# Patient Record
Sex: MALE | Race: WHITE | Employment: UNEMPLOYED
[De-identification: names, ages, dates, MRNs, and addresses within clinical notes are randomized per-mention and may not be internally consistent; named-entity substitution may affect disease eponyms.]

---

## 2023-09-24 ENCOUNTER — APPOINTMENT (OUTPATIENT)
Facility: HOSPITAL | Age: 58
End: 2023-09-24
Attending: EMERGENCY MEDICINE
Payer: COMMERCIAL

## 2023-09-24 ENCOUNTER — HOSPITAL ENCOUNTER (OUTPATIENT)
Facility: HOSPITAL | Age: 58
Setting detail: OBSERVATION
Discharge: HOME | End: 2023-09-25
Attending: EMERGENCY MEDICINE | Admitting: HOSPITALIST
Payer: COMMERCIAL

## 2023-09-24 DIAGNOSIS — L02.611 ABSCESS OF RIGHT FOOT: Primary | ICD-10-CM

## 2023-09-24 DIAGNOSIS — L03.115 CELLULITIS OF RIGHT FOOT: ICD-10-CM

## 2023-09-24 PROBLEM — L03.90 CELLULITIS: Status: ACTIVE | Noted: 2023-09-24

## 2023-09-24 PROBLEM — R73.9 HYPERGLYCEMIA: Status: ACTIVE | Noted: 2023-09-24

## 2023-09-24 PROBLEM — I10 HTN (HYPERTENSION): Status: ACTIVE | Noted: 2023-09-24

## 2023-09-24 PROBLEM — E86.0 DEHYDRATION: Status: ACTIVE | Noted: 2023-09-24

## 2023-09-24 LAB
ALBUMIN SERPL-MCNC: 3.6 G/DL (ref 3.5–5)
ALBUMIN/GLOB SERPL: 0.9 (ref 1.1–2.2)
ALP SERPL-CCNC: 95 U/L (ref 45–117)
ALT SERPL-CCNC: 38 U/L (ref 12–78)
ANION GAP SERPL CALC-SCNC: 10 MMOL/L (ref 5–15)
AST SERPL W P-5'-P-CCNC: 20 U/L (ref 15–37)
BASOPHILS # BLD: 0.1 K/UL (ref 0–0.1)
BASOPHILS NFR BLD: 1 % (ref 0–1)
BILIRUB SERPL-MCNC: 0.4 MG/DL (ref 0.2–1)
BUN SERPL-MCNC: 21 MG/DL (ref 6–20)
BUN/CREAT SERPL: 20 (ref 12–20)
CA-I BLD-MCNC: 9.1 MG/DL (ref 8.5–10.1)
CHLORIDE SERPL-SCNC: 103 MMOL/L (ref 97–108)
CO2 SERPL-SCNC: 24 MMOL/L (ref 21–32)
CREAT SERPL-MCNC: 1.06 MG/DL (ref 0.7–1.3)
CRP SERPL-MCNC: 3.68 MG/DL (ref 0–0.6)
DIFFERENTIAL METHOD BLD: ABNORMAL
EOSINOPHIL # BLD: 0.5 K/UL (ref 0–0.4)
EOSINOPHIL NFR BLD: 5 % (ref 0–7)
ERYTHROCYTE [DISTWIDTH] IN BLOOD BY AUTOMATED COUNT: 13.2 % (ref 11.5–14.5)
GLOBULIN SER CALC-MCNC: 4.2 G/DL (ref 2–4)
GLUCOSE BLD STRIP.AUTO-MCNC: 129 MG/DL (ref 65–100)
GLUCOSE BLD STRIP.AUTO-MCNC: 143 MG/DL (ref 65–100)
GLUCOSE BLD STRIP.AUTO-MCNC: 98 MG/DL (ref 65–100)
GLUCOSE SERPL-MCNC: 165 MG/DL (ref 65–100)
HCT VFR BLD AUTO: 44.5 % (ref 36.6–50.3)
HGB BLD-MCNC: 14.8 G/DL (ref 12.1–17)
IMM GRANULOCYTES # BLD AUTO: 0.3 K/UL (ref 0–0.04)
IMM GRANULOCYTES NFR BLD AUTO: 3 % (ref 0–0.5)
LYMPHOCYTES # BLD: 1.3 K/UL (ref 0.8–3.5)
LYMPHOCYTES NFR BLD: 14 % (ref 12–49)
MAGNESIUM SERPL-MCNC: 2.1 MG/DL (ref 1.6–2.4)
MCH RBC QN AUTO: 29.4 PG (ref 26–34)
MCHC RBC AUTO-ENTMCNC: 33.3 G/DL (ref 30–36.5)
MCV RBC AUTO: 88.3 FL (ref 80–99)
MONOCYTES # BLD: 0.7 K/UL (ref 0–1)
MONOCYTES NFR BLD: 8 % (ref 5–13)
NEUTS SEG # BLD: 6.3 K/UL (ref 1.8–8)
NEUTS SEG NFR BLD: 69 % (ref 32–75)
NRBC # BLD: 0 K/UL (ref 0–0.01)
NRBC BLD-RTO: 0 PER 100 WBC
PERFORMED BY:: ABNORMAL
PERFORMED BY:: ABNORMAL
PERFORMED BY:: NORMAL
PLATELET # BLD AUTO: 324 K/UL (ref 150–400)
PMV BLD AUTO: 9.8 FL (ref 8.9–12.9)
POTASSIUM SERPL-SCNC: 3.9 MMOL/L (ref 3.5–5.1)
PROT SERPL-MCNC: 7.8 G/DL (ref 6.4–8.2)
RBC # BLD AUTO: 5.04 M/UL (ref 4.1–5.7)
RBC MORPH BLD: ABNORMAL
SODIUM SERPL-SCNC: 137 MMOL/L (ref 136–145)
URATE SERPL-MCNC: 3.7 MG/DL (ref 3.5–7.2)
WBC # BLD AUTO: 9.2 K/UL (ref 4.1–11.1)
WBC MORPH BLD: ABNORMAL

## 2023-09-24 PROCEDURE — 87205 SMEAR GRAM STAIN: CPT

## 2023-09-24 PROCEDURE — 87147 CULTURE TYPE IMMUNOLOGIC: CPT

## 2023-09-24 PROCEDURE — 84550 ASSAY OF BLOOD/URIC ACID: CPT

## 2023-09-24 PROCEDURE — 2580000003 HC RX 258: Performed by: HOSPITALIST

## 2023-09-24 PROCEDURE — 99285 EMERGENCY DEPT VISIT HI MDM: CPT

## 2023-09-24 PROCEDURE — 87070 CULTURE OTHR SPECIMN AEROBIC: CPT

## 2023-09-24 PROCEDURE — 6370000000 HC RX 637 (ALT 250 FOR IP): Performed by: HOSPITALIST

## 2023-09-24 PROCEDURE — G0378 HOSPITAL OBSERVATION PER HR: HCPCS

## 2023-09-24 PROCEDURE — 6360000002 HC RX W HCPCS: Performed by: HOSPITALIST

## 2023-09-24 PROCEDURE — 83735 ASSAY OF MAGNESIUM: CPT

## 2023-09-24 PROCEDURE — 96366 THER/PROPH/DIAG IV INF ADDON: CPT

## 2023-09-24 PROCEDURE — 2500000003 HC RX 250 WO HCPCS: Performed by: EMERGENCY MEDICINE

## 2023-09-24 PROCEDURE — 73700 CT LOWER EXTREMITY W/O DYE: CPT

## 2023-09-24 PROCEDURE — 87186 SC STD MICRODIL/AGAR DIL: CPT

## 2023-09-24 PROCEDURE — 84145 PROCALCITONIN (PCT): CPT

## 2023-09-24 PROCEDURE — 6360000002 HC RX W HCPCS: Performed by: EMERGENCY MEDICINE

## 2023-09-24 PROCEDURE — 96365 THER/PROPH/DIAG IV INF INIT: CPT

## 2023-09-24 PROCEDURE — 87077 CULTURE AEROBIC IDENTIFY: CPT

## 2023-09-24 PROCEDURE — 86140 C-REACTIVE PROTEIN: CPT

## 2023-09-24 PROCEDURE — 96372 THER/PROPH/DIAG INJ SC/IM: CPT

## 2023-09-24 PROCEDURE — 85025 COMPLETE CBC W/AUTO DIFF WBC: CPT

## 2023-09-24 PROCEDURE — 82962 GLUCOSE BLOOD TEST: CPT

## 2023-09-24 PROCEDURE — 80053 COMPREHEN METABOLIC PANEL: CPT

## 2023-09-24 PROCEDURE — 2580000003 HC RX 258: Performed by: EMERGENCY MEDICINE

## 2023-09-24 PROCEDURE — 36415 COLL VENOUS BLD VENIPUNCTURE: CPT

## 2023-09-24 PROCEDURE — 96376 TX/PRO/DX INJ SAME DRUG ADON: CPT

## 2023-09-24 PROCEDURE — 83036 HEMOGLOBIN GLYCOSYLATED A1C: CPT

## 2023-09-24 PROCEDURE — 96375 TX/PRO/DX INJ NEW DRUG ADDON: CPT

## 2023-09-24 RX ORDER — METOPROLOL SUCCINATE 25 MG/1
25 TABLET, EXTENDED RELEASE ORAL DAILY
Status: DISCONTINUED | OUTPATIENT
Start: 2023-09-25 | End: 2023-09-25 | Stop reason: HOSPADM

## 2023-09-24 RX ORDER — METOPROLOL SUCCINATE 25 MG/1
TABLET, EXTENDED RELEASE ORAL
COMMUNITY
Start: 2023-07-14

## 2023-09-24 RX ORDER — ACETAMINOPHEN 650 MG/1
650 SUPPOSITORY RECTAL EVERY 6 HOURS PRN
Status: DISCONTINUED | OUTPATIENT
Start: 2023-09-24 | End: 2023-09-25 | Stop reason: HOSPADM

## 2023-09-24 RX ORDER — ENOXAPARIN SODIUM 100 MG/ML
40 INJECTION SUBCUTANEOUS DAILY
Status: DISCONTINUED | OUTPATIENT
Start: 2023-09-24 | End: 2023-09-25 | Stop reason: HOSPADM

## 2023-09-24 RX ORDER — LOVASTATIN 40 MG/1
40 TABLET ORAL DAILY
COMMUNITY
Start: 2023-07-14

## 2023-09-24 RX ORDER — ONDANSETRON 4 MG/1
4 TABLET, ORALLY DISINTEGRATING ORAL EVERY 8 HOURS PRN
Status: DISCONTINUED | OUTPATIENT
Start: 2023-09-24 | End: 2023-09-25 | Stop reason: HOSPADM

## 2023-09-24 RX ORDER — ACETAMINOPHEN 325 MG/1
650 TABLET ORAL EVERY 6 HOURS PRN
Status: DISCONTINUED | OUTPATIENT
Start: 2023-09-24 | End: 2023-09-25 | Stop reason: HOSPADM

## 2023-09-24 RX ORDER — SODIUM CHLORIDE AND POTASSIUM CHLORIDE 150; 900 MG/100ML; MG/100ML
INJECTION, SOLUTION INTRAVENOUS CONTINUOUS
Status: DISCONTINUED | OUTPATIENT
Start: 2023-09-24 | End: 2023-09-25 | Stop reason: HOSPADM

## 2023-09-24 RX ORDER — ATORVASTATIN CALCIUM 20 MG/1
10 TABLET, FILM COATED ORAL DAILY
Status: DISCONTINUED | OUTPATIENT
Start: 2023-09-25 | End: 2023-09-25 | Stop reason: HOSPADM

## 2023-09-24 RX ORDER — DEXTROSE MONOHYDRATE 100 MG/ML
INJECTION, SOLUTION INTRAVENOUS CONTINUOUS PRN
Status: DISCONTINUED | OUTPATIENT
Start: 2023-09-24 | End: 2023-09-25 | Stop reason: HOSPADM

## 2023-09-24 RX ORDER — SODIUM CHLORIDE 0.9 % (FLUSH) 0.9 %
5-40 SYRINGE (ML) INJECTION EVERY 12 HOURS SCHEDULED
Status: DISCONTINUED | OUTPATIENT
Start: 2023-09-24 | End: 2023-09-25 | Stop reason: HOSPADM

## 2023-09-24 RX ORDER — SULFAMETHOXAZOLE AND TRIMETHOPRIM 800; 160 MG/1; MG/1
1 TABLET ORAL EVERY 12 HOURS
Status: ON HOLD | COMMUNITY
Start: 2023-09-19 | End: 2023-09-25 | Stop reason: HOSPADM

## 2023-09-24 RX ORDER — LIDOCAINE HYDROCHLORIDE 10 MG/ML
5 INJECTION, SOLUTION EPIDURAL; INFILTRATION; INTRACAUDAL; PERINEURAL
Status: COMPLETED | OUTPATIENT
Start: 2023-09-24 | End: 2023-09-24

## 2023-09-24 RX ORDER — INSULIN LISPRO 100 [IU]/ML
0-8 INJECTION, SOLUTION INTRAVENOUS; SUBCUTANEOUS
Status: DISCONTINUED | OUTPATIENT
Start: 2023-09-24 | End: 2023-09-25

## 2023-09-24 RX ORDER — SODIUM CHLORIDE 0.9 % (FLUSH) 0.9 %
5-40 SYRINGE (ML) INJECTION PRN
Status: DISCONTINUED | OUTPATIENT
Start: 2023-09-24 | End: 2023-09-25 | Stop reason: HOSPADM

## 2023-09-24 RX ORDER — TRIAMCINOLONE ACETONIDE 1 MG/G
OINTMENT TOPICAL
COMMUNITY
Start: 2023-07-20

## 2023-09-24 RX ORDER — INSULIN LISPRO 100 [IU]/ML
0-4 INJECTION, SOLUTION INTRAVENOUS; SUBCUTANEOUS NIGHTLY
Status: DISCONTINUED | OUTPATIENT
Start: 2023-09-24 | End: 2023-09-25

## 2023-09-24 RX ORDER — SODIUM CHLORIDE 9 MG/ML
INJECTION, SOLUTION INTRAVENOUS PRN
Status: DISCONTINUED | OUTPATIENT
Start: 2023-09-24 | End: 2023-09-25 | Stop reason: HOSPADM

## 2023-09-24 RX ORDER — KETOROLAC TROMETHAMINE 30 MG/ML
30 INJECTION, SOLUTION INTRAMUSCULAR; INTRAVENOUS EVERY 6 HOURS
Status: DISCONTINUED | OUTPATIENT
Start: 2023-09-24 | End: 2023-09-25 | Stop reason: HOSPADM

## 2023-09-24 RX ORDER — ONDANSETRON 2 MG/ML
4 INJECTION INTRAMUSCULAR; INTRAVENOUS EVERY 6 HOURS PRN
Status: DISCONTINUED | OUTPATIENT
Start: 2023-09-24 | End: 2023-09-25 | Stop reason: HOSPADM

## 2023-09-24 RX ORDER — POLYETHYLENE GLYCOL 3350 17 G/17G
17 POWDER, FOR SOLUTION ORAL DAILY PRN
Status: DISCONTINUED | OUTPATIENT
Start: 2023-09-24 | End: 2023-09-25 | Stop reason: HOSPADM

## 2023-09-24 RX ADMIN — SODIUM CHLORIDE, PRESERVATIVE FREE 10 ML: 5 INJECTION INTRAVENOUS at 14:27

## 2023-09-24 RX ADMIN — KETOROLAC TROMETHAMINE 30 MG: 30 INJECTION, SOLUTION INTRAMUSCULAR; INTRAVENOUS at 14:25

## 2023-09-24 RX ADMIN — PIPERACILLIN SODIUM AND TAZOBACTAM SODIUM 3375 MG: 3; .375 INJECTION, POWDER, LYOPHILIZED, FOR SOLUTION INTRAVENOUS at 17:22

## 2023-09-24 RX ADMIN — POTASSIUM CHLORIDE AND SODIUM CHLORIDE: 900; 150 INJECTION, SOLUTION INTRAVENOUS at 14:25

## 2023-09-24 RX ADMIN — LIDOCAINE HYDROCHLORIDE 5 ML: 10 INJECTION, SOLUTION EPIDURAL; INFILTRATION; INTRACAUDAL; PERINEURAL at 10:46

## 2023-09-24 RX ADMIN — ENOXAPARIN SODIUM 40 MG: 100 INJECTION SUBCUTANEOUS at 14:26

## 2023-09-24 RX ADMIN — PIPERACILLIN AND TAZOBACTAM 3375 MG: 3; .375 INJECTION, POWDER, LYOPHILIZED, FOR SOLUTION INTRAVENOUS at 10:54

## 2023-09-24 RX ADMIN — SODIUM CHLORIDE, PRESERVATIVE FREE 10 ML: 5 INJECTION INTRAVENOUS at 17:23

## 2023-09-24 RX ADMIN — SODIUM CHLORIDE, PRESERVATIVE FREE 10 ML: 5 INJECTION INTRAVENOUS at 21:10

## 2023-09-24 RX ADMIN — KETOROLAC TROMETHAMINE 30 MG: 30 INJECTION, SOLUTION INTRAMUSCULAR; INTRAVENOUS at 20:07

## 2023-09-24 RX ADMIN — METOPROLOL TARTRATE 25 MG: 25 TABLET, FILM COATED ORAL at 14:42

## 2023-09-24 ASSESSMENT — PAIN SCALES - GENERAL
PAINLEVEL_OUTOF10: 0

## 2023-09-24 ASSESSMENT — ENCOUNTER SYMPTOMS
GASTROINTESTINAL NEGATIVE: 1
ROS SKIN COMMENTS: RIGHT FOOT
RESPIRATORY NEGATIVE: 1
EYES NEGATIVE: 1
ALLERGIC/IMMUNOLOGIC NEGATIVE: 1

## 2023-09-24 ASSESSMENT — PAIN SCALES - WONG BAKER: WONGBAKER_NUMERICALRESPONSE: 2

## 2023-09-24 ASSESSMENT — PAIN - FUNCTIONAL ASSESSMENT: PAIN_FUNCTIONAL_ASSESSMENT: WONG-BAKER FACES

## 2023-09-24 NOTE — ED TRIAGE NOTES
Pt states that he went to urgent care on Tuesday and they told him he had cellulitis of his right foot and gave him Bactrim BID [id number]. He was told to follow up with the ED if it does not get better in a few days. Pt states that it is not better and that he is now having puss come out of his foot.

## 2023-09-24 NOTE — PROGRESS NOTES
Patient arrived to room with a 4 x 4 bandage loose on right foot- ER tech stated patient removed MD's dressing- wound remain packed but draining large amount of bloody drainage foot wiped off and dry dressing reapplied with gripper sock in place. Incision 4 cm wide and 3 cm in length on top right foot.

## 2023-09-24 NOTE — ED NOTES
The top of the pt's right foot is a large redded blistered area with some scabs long the middle toe. Pt has swelling and redness to the top of the right foot upwards toward the middle of the foot. Pt has <3 seconds cap refill and sensation in all of the toes on the right foot. Pt denies having knowledge if he was bitten by something but does state that he had a blister on the top of the right middle toe that he popped prior to all of the inflammation and redness occurring.       Carlos Nunes RN  09/24/23 1004

## 2023-09-24 NOTE — H&P
History and Physical  Dr Fernando Hernandez MD      Chief Complaints:     Chief Complaint   Patient presents with    Cellulitis     Right foot         Subjective:     Hammad Payne is a 63 y.o. male followed by Philomena Hough MD and  has a past medical history of Hypercholesteremia and Hypertension. Presents with worsening right foot swelling/pain/redness. States it started on Tuesday He noted a bump on second toe and she popped blister and since that time he noted increasing swelling and continued to progress and so went on Wednesday to the urgent care and was started on Bactrim on 9/19 but he continued to have worsening swelling redness and pain. He says he still was able to stand but was having pain. He was not able to follow-up with his primary provider as currently she is on occasion but came to the emergency evaluated  -Initial laboratory data showed a normal white blood count vital signs showed elevated blood pressure 150/115 with a heart rate of 113 and ER physician performed bedside incision and drainage with copious discharge noted and sent for culture. Patient did not meet sepsis bacteria as no fever or elevated white blood count. After incision drainage wound was packed and requested CT of the foot which shows 0.9 x 1.5 cm area of concern with no further evidence of abscess or osteomyelitis noted. Was given Zosyn 3.275 g x 1. Discussed management with the ED provider Dr. Samy Choe MD and agree with the plan for hospitalization and observation for acute right foot cellulitis      Past Medical History:   Diagnosis Date    Hypercholesteremia     Hypertension       History reviewed. No pertinent surgical history. History reviewed. No pertinent family history. Social History     Tobacco Use    Smoking status: Never    Smokeless tobacco: Never   Substance Use Topics    Alcohol use: Not Currently       Prior to Admission medications    Medication Sig Start Date End Date Taking?  Authorizing

## 2023-09-24 NOTE — ED PROVIDER NOTES
[facility] EMERGENCY DEPT  EMERGENCY DEPARTMENT ENCOUNTER      Pt Name: Yolanda Tsai  MRN: [id number]  [address] [facility] [place] 1965  Date of evaluation: 9/24/2023  Provider: Cate Joaquin MD    CHIEF COMPLAINT       Chief Complaint   Patient presents with    Cellulitis     Right foot         HISTORY OF PRESENT ILLNESS   (Location/Symptom, Timing/Onset, Context/Setting, Quality, Duration, Modifying Factors, Severity)  Note limiting factors. Yolanda Tsai is a 63 y.o. male who presents to the emergency department section of right foot beginning over a week ago was seen in urgent care 6 days ago and begun on Bactrim DS twice daily which the patient has been compliant with. The area of redness and swelling his dorsal right foot has become worse over the last 2 days. Some purulent drainage. History of hypertension all hypercholesterol though no history of diabetes or prior abscess or MRSA. Denies fever chills nausea vomiting no other complaints    HPI    Nursing Notes were reviewed. REVIEW OF SYSTEMS    (2-9 systems for level 4, 10 or more for level 5)     Review of Systems   All other systems reviewed and are negative. Except as noted above the remainder of the review of systems was reviewed and negative. PAST MEDICAL HISTORY     Past Medical History:   Diagnosis Date    Hypercholesteremia     Hypertension          SURGICAL HISTORY     History reviewed. No pertinent surgical history. CURRENT MEDICATIONS       Previous Medications    LOVASTATIN (MEVACOR) 40 MG TABLET    Take 1 tablet by mouth daily    METOPROLOL SUCCINATE (TOPROL XL) 25 MG EXTENDED RELEASE TABLET        SULFAMETHOXAZOLE-TRIMETHOPRIM (BACTRIM DS;SEPTRA DS) 800-160 MG PER TABLET    Take 1 tablet by mouth in the morning and 1 tablet in the evening. for 10 days. TRIAMCINOLONE (KENALOG) 0.1 % OINTMENT           ALLERGIES     Patient has no known allergies. FAMILY HISTORY     History reviewed. No pertinent family history.        SOCIAL

## 2023-09-25 VITALS
RESPIRATION RATE: 18 BRPM | HEIGHT: 69 IN | WEIGHT: 166.06 LBS | TEMPERATURE: 98.6 F | HEART RATE: 84 BPM | SYSTOLIC BLOOD PRESSURE: 118 MMHG | BODY MASS INDEX: 24.59 KG/M2 | DIASTOLIC BLOOD PRESSURE: 91 MMHG | OXYGEN SATURATION: 96 %

## 2023-09-25 LAB
ANION GAP SERPL CALC-SCNC: 8 MMOL/L (ref 5–15)
BASOPHILS # BLD: 0.1 K/UL (ref 0–0.1)
BASOPHILS NFR BLD: 1 % (ref 0–1)
BUN SERPL-MCNC: 16 MG/DL (ref 6–20)
BUN/CREAT SERPL: 16 (ref 12–20)
CA-I BLD-MCNC: 8.6 MG/DL (ref 8.5–10.1)
CHLORIDE SERPL-SCNC: 107 MMOL/L (ref 97–108)
CHOLEST SERPL-MCNC: 132 MG/DL
CO2 SERPL-SCNC: 26 MMOL/L (ref 21–32)
CREAT SERPL-MCNC: 1 MG/DL (ref 0.7–1.3)
DIFFERENTIAL METHOD BLD: ABNORMAL
EOSINOPHIL # BLD: 0.6 K/UL (ref 0–0.4)
EOSINOPHIL NFR BLD: 10 % (ref 0–7)
ERYTHROCYTE [DISTWIDTH] IN BLOOD BY AUTOMATED COUNT: 13 % (ref 11.5–14.5)
EST. AVERAGE GLUCOSE BLD GHB EST-MCNC: 114 MG/DL
GLUCOSE BLD STRIP.AUTO-MCNC: 104 MG/DL (ref 65–100)
GLUCOSE SERPL-MCNC: 106 MG/DL (ref 65–100)
HBA1C MFR BLD: 5.6 % (ref 4–5.6)
HCT VFR BLD AUTO: 39.6 % (ref 36.6–50.3)
HDLC SERPL-MCNC: 27 MG/DL
HDLC SERPL: 4.9 (ref 0–5)
HGB BLD-MCNC: 13.3 G/DL (ref 12.1–17)
IMM GRANULOCYTES # BLD AUTO: 0 K/UL
IMM GRANULOCYTES NFR BLD AUTO: 0 %
LDLC SERPL CALC-MCNC: 79 MG/DL (ref 0–100)
LIPID PANEL: NORMAL
LYMPHOCYTES # BLD: 1.2 K/UL (ref 0.8–3.5)
LYMPHOCYTES NFR BLD: 21 % (ref 12–49)
MCH RBC QN AUTO: 29.6 PG (ref 26–34)
MCHC RBC AUTO-ENTMCNC: 33.6 G/DL (ref 30–36.5)
MCV RBC AUTO: 88.2 FL (ref 80–99)
MONOCYTES # BLD: 0.9 K/UL (ref 0–1)
MONOCYTES NFR BLD: 15 % (ref 5–13)
NEUTS SEG # BLD: 3.1 K/UL (ref 1.8–8)
NEUTS SEG NFR BLD: 53 % (ref 32–75)
NRBC # BLD: 0 K/UL (ref 0–0.01)
NRBC BLD-RTO: 0 PER 100 WBC
PERFORMED BY:: ABNORMAL
PLATELET # BLD AUTO: 320 K/UL (ref 150–400)
PLATELET COMMENT: ABNORMAL
PMV BLD AUTO: 10 FL (ref 8.9–12.9)
POTASSIUM SERPL-SCNC: 4.5 MMOL/L (ref 3.5–5.1)
PROCALCITONIN SERPL-MCNC: <0.05 NG/ML
RBC # BLD AUTO: 4.49 M/UL (ref 4.1–5.7)
RBC MORPH BLD: ABNORMAL
SODIUM SERPL-SCNC: 141 MMOL/L (ref 136–145)
TRIGL SERPL-MCNC: 130 MG/DL
VLDLC SERPL CALC-MCNC: 26 MG/DL
WBC # BLD AUTO: 5.9 K/UL (ref 4.1–11.1)

## 2023-09-25 PROCEDURE — 2580000003 HC RX 258: Performed by: HOSPITALIST

## 2023-09-25 PROCEDURE — 6360000002 HC RX W HCPCS: Performed by: HOSPITALIST

## 2023-09-25 PROCEDURE — 85025 COMPLETE CBC W/AUTO DIFF WBC: CPT

## 2023-09-25 PROCEDURE — 96376 TX/PRO/DX INJ SAME DRUG ADON: CPT

## 2023-09-25 PROCEDURE — G0378 HOSPITAL OBSERVATION PER HR: HCPCS

## 2023-09-25 PROCEDURE — 80061 LIPID PANEL: CPT

## 2023-09-25 PROCEDURE — 96366 THER/PROPH/DIAG IV INF ADDON: CPT

## 2023-09-25 PROCEDURE — 36415 COLL VENOUS BLD VENIPUNCTURE: CPT

## 2023-09-25 PROCEDURE — 82962 GLUCOSE BLOOD TEST: CPT

## 2023-09-25 PROCEDURE — 80048 BASIC METABOLIC PNL TOTAL CA: CPT

## 2023-09-25 PROCEDURE — 96367 TX/PROPH/DG ADDL SEQ IV INF: CPT

## 2023-09-25 PROCEDURE — 6370000000 HC RX 637 (ALT 250 FOR IP): Performed by: HOSPITALIST

## 2023-09-25 RX ORDER — AMOXICILLIN AND CLAVULANATE POTASSIUM 875; 125 MG/1; MG/1
1 TABLET, FILM COATED ORAL 2 TIMES DAILY
Qty: 20 TABLET | Refills: 0 | Status: SHIPPED | OUTPATIENT
Start: 2023-09-25 | End: 2023-10-05

## 2023-09-25 RX ORDER — TRAMADOL HYDROCHLORIDE 50 MG/1
50 TABLET ORAL EVERY 6 HOURS PRN
Qty: 12 TABLET | Refills: 0 | Status: SHIPPED | OUTPATIENT
Start: 2023-09-25 | End: 2023-09-28

## 2023-09-25 RX ADMIN — METOPROLOL SUCCINATE 25 MG: 25 TABLET, EXTENDED RELEASE ORAL at 09:10

## 2023-09-25 RX ADMIN — POTASSIUM CHLORIDE AND SODIUM CHLORIDE: 900; 150 INJECTION, SOLUTION INTRAVENOUS at 07:49

## 2023-09-25 RX ADMIN — PIPERACILLIN SODIUM AND TAZOBACTAM SODIUM 3375 MG: 3; .375 INJECTION, POWDER, LYOPHILIZED, FOR SOLUTION INTRAVENOUS at 01:11

## 2023-09-25 RX ADMIN — ATORVASTATIN CALCIUM 10 MG: 20 TABLET, FILM COATED ORAL at 09:10

## 2023-09-25 RX ADMIN — KETOROLAC TROMETHAMINE 30 MG: 30 INJECTION, SOLUTION INTRAMUSCULAR; INTRAVENOUS at 07:50

## 2023-09-25 RX ADMIN — SODIUM CHLORIDE, PRESERVATIVE FREE 10 ML: 5 INJECTION INTRAVENOUS at 07:50

## 2023-09-25 RX ADMIN — KETOROLAC TROMETHAMINE 30 MG: 30 INJECTION, SOLUTION INTRAMUSCULAR; INTRAVENOUS at 01:11

## 2023-09-25 ASSESSMENT — PAIN SCALES - GENERAL
PAINLEVEL_OUTOF10: 0

## 2023-09-25 NOTE — PLAN OF CARE
Problem: Pain  Goal: Verbalizes/displays adequate comfort level or baseline comfort level  9/25/2023 1116 by Claudia Ring RN  [address] Progressing  9/25/2023 0827 by Claudia Ring RN  Outcome: Progressing  Flowsheets (Taken 9/25/2023 0827)  Verbalizes/displays adequate comfort level or baseline comfort level:   Encourage patient to monitor pain and request assistance   Assess pain using appropriate pain scale   Administer analgesics based on type and severity of pain and evaluate response   Implement non-pharmacological measures as appropriate and evaluate response
Problem: Pain  Goal: Verbalizes/displays adequate comfort level or baseline comfort level  Outcome: Progressing  Flowsheets (Taken 9/24/2023 1502)  Verbalizes/displays adequate comfort level or baseline comfort level:   Encourage patient to monitor pain and request assistance   Assess pain using appropriate pain scale   Administer analgesics based on type and severity of pain and evaluate response
Problem: Pain  Goal: Verbalizes/displays adequate comfort level or baseline comfort level  Outcome: Progressing  Flowsheets (Taken 9/25/2023 7148)  Verbalizes/displays adequate comfort level or baseline comfort level:   Encourage patient to monitor pain and request assistance   Assess pain using appropriate pain scale   Administer analgesics based on type and severity of pain and evaluate response   Implement non-pharmacological measures as appropriate and evaluate response
no

## 2023-09-25 NOTE — PROGRESS NOTES
Was notified after patient discharge this AM of wound culture results which prelim showing heavy growth MRSA and confirmed with positive Ag detection. Patient was on zosyn and prior to admission on bactrim. Was able to communicate over the phone with patient to no longer take augmentin abx and for now to continue previous bactrim dosing. Will follow final culture results and reach out to patient once again and given new abx depending on final culture results.

## 2023-09-25 NOTE — PROGRESS NOTES
Discharge instructions including wound care, infection signs and symptoms, medications and upcoming appointments. All questions answered. Patient escorted to vehicle via w/chair.

## 2023-09-25 NOTE — PROGRESS NOTES
9/25/2023        RE: Eugenia Foreman         [facility] [address]          To Whom It May Concern,      Due to medical reasons, Eugenia Foreman was under hospitallist service at [facility] from 9/24 until 9/25 and can return to work after follow up appt on 9/29           Sincerely,          Lauren Ahn MD

## 2023-09-26 LAB
BACTERIA SPEC CULT: ABNORMAL
BACTERIA SPEC CULT: ABNORMAL
GRAM STN SPEC: ABNORMAL
Lab: ABNORMAL

## 2023-09-27 ENCOUNTER — TELEPHONE (OUTPATIENT)
Facility: HOSPITAL | Age: 58
End: 2023-09-27

## 2023-09-27 RX ORDER — DOXYCYCLINE HYCLATE 100 MG
100 TABLET ORAL 2 TIMES DAILY
Qty: 20 TABLET | Refills: 0 | Status: SHIPPED | OUTPATIENT
Start: 2023-09-27 | End: 2023-10-07

## 2023-09-27 NOTE — TELEPHONE ENCOUNTER
Attempted to call patient for follow up of wound culture which shows MRSA but sensitive to Doxycycline. Will send prescription to to [address] [place] [address] for doxycycline 100mg oral bid x 10 days and also have patient stop bactrim and to start augmentin dosing bid that was previously sent in and to complete both abx and follow up with wound care clinic.      Will reach out to patient again tomorrow and inform him of plan

## 2023-09-28 ENCOUNTER — TELEPHONE (OUTPATIENT)
Facility: HOSPITAL | Age: 58
End: 2023-09-28

## 2023-09-28 NOTE — TELEPHONE ENCOUNTER
Was able to reach patient over the phone and explained about treatment plan and change in Abx use. Asked him to stop taking the bactrim abx and take augmentin bid that I ordered on discharge but also to  prescrition I sent to his pharmacy for doxy 100mg oral bid x 10 days. Reminded patient he has a wound care clinic scheduled tomorrow and urged him to go and get wound reevaluated. He says only minimal drainage in foot and pain is better an he is able to walk with no issues.

## 2023-09-29 ENCOUNTER — TELEPHONE (OUTPATIENT)
Facility: HOSPITAL | Age: 58
End: 2023-09-29

## 2023-09-29 RX ORDER — DOXYCYCLINE HYCLATE 100 MG
100 TABLET ORAL 2 TIMES DAILY
Qty: 20 TABLET | Refills: 0 | Status: SHIPPED | OUTPATIENT
Start: 2023-09-29 | End: 2023-10-09

## 2023-10-18 ENCOUNTER — OFFICE VISIT (OUTPATIENT)
Age: 58
End: 2023-10-18
Payer: COMMERCIAL

## 2023-10-18 VITALS
WEIGHT: 164 LBS | HEIGHT: 69 IN | SYSTOLIC BLOOD PRESSURE: 137 MMHG | TEMPERATURE: 98.6 F | HEART RATE: 82 BPM | DIASTOLIC BLOOD PRESSURE: 93 MMHG | RESPIRATION RATE: 16 BRPM | BODY MASS INDEX: 24.29 KG/M2 | OXYGEN SATURATION: 98 %

## 2023-10-18 DIAGNOSIS — L03.115 CELLULITIS OF RIGHT LOWER EXTREMITY: Primary | ICD-10-CM

## 2023-10-18 PROCEDURE — 99203 OFFICE O/P NEW LOW 30 MIN: CPT | Performed by: PODIATRIST

## 2023-10-18 PROCEDURE — 3078F DIAST BP <80 MM HG: CPT | Performed by: PODIATRIST

## 2023-10-18 PROCEDURE — 3074F SYST BP LT 130 MM HG: CPT | Performed by: PODIATRIST

## 2023-10-24 PROBLEM — E86.0 DEHYDRATION: Status: RESOLVED | Noted: 2023-09-24 | Resolved: 2023-10-24

## 2023-11-01 ASSESSMENT — ENCOUNTER SYMPTOMS
SHORTNESS OF BREATH: 0
DIARRHEA: 0
VOMITING: 0
ABDOMINAL PAIN: 0

## 2024-11-21 ENCOUNTER — APPOINTMENT (OUTPATIENT)
Facility: HOSPITAL | Age: 59
DRG: 854 | End: 2024-11-21
Payer: COMMERCIAL

## 2024-11-21 ENCOUNTER — HOSPITAL ENCOUNTER (INPATIENT)
Facility: HOSPITAL | Age: 59
LOS: 4 days | Discharge: HOME | DRG: 854 | End: 2024-11-25
Attending: EMERGENCY MEDICINE | Admitting: FAMILY MEDICINE
Payer: COMMERCIAL

## 2024-11-21 DIAGNOSIS — L02.415 ABSCESS OF RIGHT LEG: ICD-10-CM

## 2024-11-21 DIAGNOSIS — L03.115 CELLULITIS OF RIGHT LOWER EXTREMITY: Primary | ICD-10-CM

## 2024-11-21 LAB
ALBUMIN SERPL-MCNC: 3.6 G/DL (ref 3.5–5)
ALBUMIN/GLOB SERPL: 1.1 (ref 1.1–2.2)
ALP SERPL-CCNC: 105 U/L (ref 45–117)
ALT SERPL-CCNC: 34 U/L (ref 12–78)
ANION GAP BLD CALC-SCNC: 15
ANION GAP SERPL CALC-SCNC: 7 MMOL/L (ref 2–12)
AST SERPL W P-5'-P-CCNC: 19 U/L (ref 15–37)
BASOPHILS # BLD: 0.1 K/UL (ref 0–0.1)
BASOPHILS NFR BLD: 1 % (ref 0–1)
BILIRUB SERPL-MCNC: 0.8 MG/DL (ref 0.2–1)
BUN SERPL-MCNC: 22 MG/DL (ref 6–20)
BUN/CREAT SERPL: 22 (ref 12–20)
CA-I BLD-MCNC: 1.13 MMOL/L (ref 1.12–1.32)
CA-I BLD-MCNC: 8.7 MG/DL (ref 8.5–10.1)
CHLORIDE BLD-SCNC: 102 MMOL/L (ref 98–107)
CHLORIDE SERPL-SCNC: 108 MMOL/L (ref 97–108)
CHP ED QC CHECK: YES
CO2 BLD-SCNC: 24 MMOL/L
CO2 SERPL-SCNC: 25 MMOL/L (ref 21–32)
CREAT SERPL-MCNC: 0.98 MG/DL (ref 0.7–1.3)
CREAT UR-MCNC: 0.78 MG/DL (ref 0.6–1.3)
DIFFERENTIAL METHOD BLD: ABNORMAL
EKG ATRIAL RATE: 104 BPM
EKG DIAGNOSIS: NORMAL
EKG P AXIS: 63 DEGREES
EKG P-R INTERVAL: 168 MS
EKG Q-T INTERVAL: 342 MS
EKG QRS DURATION: 84 MS
EKG QTC CALCULATION (BAZETT): 449 MS
EKG R AXIS: 11 DEGREES
EKG T AXIS: 42 DEGREES
EKG VENTRICULAR RATE: 104 BPM
EOSINOPHIL # BLD: 0.2 K/UL (ref 0–0.4)
EOSINOPHIL NFR BLD: 2 % (ref 0–7)
ERYTHROCYTE [DISTWIDTH] IN BLOOD BY AUTOMATED COUNT: 13.4 % (ref 11.5–14.5)
GLOBULIN SER CALC-MCNC: 3.2 G/DL (ref 2–4)
GLUCOSE BLD STRIP.AUTO-MCNC: 114 MG/DL (ref 65–100)
GLUCOSE SERPL-MCNC: 112 MG/DL (ref 65–100)
HCT VFR BLD AUTO: 39.1 % (ref 36.6–50.3)
HGB BLD-MCNC: 13.3 G/DL (ref 12.1–17)
IMM GRANULOCYTES # BLD AUTO: 0.2 K/UL (ref 0–0.04)
IMM GRANULOCYTES NFR BLD AUTO: 1 % (ref 0–0.5)
LACTATE BLD-SCNC: 1.95 MMOL/L (ref 0.4–2)
LYMPHOCYTES # BLD: 0.9 K/UL (ref 0.8–3.5)
LYMPHOCYTES NFR BLD: 6 % (ref 12–49)
MCH RBC QN AUTO: 29.6 PG (ref 26–34)
MCHC RBC AUTO-ENTMCNC: 34 G/DL (ref 30–36.5)
MCV RBC AUTO: 87.1 FL (ref 80–99)
MONOCYTES # BLD: 1.4 K/UL (ref 0–1)
MONOCYTES NFR BLD: 10 % (ref 5–13)
NEUTS SEG # BLD: 11.6 K/UL (ref 1.8–8)
NEUTS SEG NFR BLD: 80 % (ref 32–75)
NRBC # BLD: 0 K/UL (ref 0–0.01)
NRBC BLD-RTO: 0 PER 100 WBC
PERFORMED BY:: ABNORMAL
PLATELET # BLD AUTO: 298 K/UL (ref 150–400)
PMV BLD AUTO: 10.5 FL (ref 8.9–12.9)
POTASSIUM BLD-SCNC: 3.3 MMOL/L (ref 3.5–5.5)
POTASSIUM SERPL-SCNC: 3.8 MMOL/L (ref 3.5–5.1)
PROCALCITONIN SERPL-MCNC: 0.13 NG/ML
PROT SERPL-MCNC: 6.8 G/DL (ref 6.4–8.2)
RBC # BLD AUTO: 4.49 M/UL (ref 4.1–5.7)
SODIUM BLD-SCNC: 140 MMOL/L (ref 136–145)
SODIUM SERPL-SCNC: 140 MMOL/L (ref 136–145)
SPECIMEN SITE: ABNORMAL
TROPONIN I SERPL HS-MCNC: 11 NG/L (ref 0–76)
WBC # BLD AUTO: 14.3 K/UL (ref 4.1–11.1)

## 2024-11-21 PROCEDURE — 83605 ASSAY OF LACTIC ACID: CPT

## 2024-11-21 PROCEDURE — 80053 COMPREHEN METABOLIC PANEL: CPT

## 2024-11-21 PROCEDURE — 96366 THER/PROPH/DIAG IV INF ADDON: CPT

## 2024-11-21 PROCEDURE — 87040 BLOOD CULTURE FOR BACTERIA: CPT

## 2024-11-21 PROCEDURE — 84145 PROCALCITONIN (PCT): CPT

## 2024-11-21 PROCEDURE — 96375 TX/PRO/DX INJ NEW DRUG ADDON: CPT

## 2024-11-21 PROCEDURE — 36415 COLL VENOUS BLD VENIPUNCTURE: CPT

## 2024-11-21 PROCEDURE — 96365 THER/PROPH/DIAG IV INF INIT: CPT

## 2024-11-21 PROCEDURE — 1100000000 HC RM PRIVATE

## 2024-11-21 PROCEDURE — 6360000002 HC RX W HCPCS: Performed by: EMERGENCY MEDICINE

## 2024-11-21 PROCEDURE — 99285 EMERGENCY DEPT VISIT HI MDM: CPT

## 2024-11-21 PROCEDURE — 85025 COMPLETE CBC W/AUTO DIFF WBC: CPT

## 2024-11-21 PROCEDURE — 73590 X-RAY EXAM OF LOWER LEG: CPT

## 2024-11-21 PROCEDURE — 2580000003 HC RX 258: Performed by: EMERGENCY MEDICINE

## 2024-11-21 PROCEDURE — 80047 BASIC METABLC PNL IONIZED CA: CPT

## 2024-11-21 PROCEDURE — 2580000003 HC RX 258: Performed by: FAMILY MEDICINE

## 2024-11-21 PROCEDURE — 93005 ELECTROCARDIOGRAM TRACING: CPT | Performed by: EMERGENCY MEDICINE

## 2024-11-21 PROCEDURE — 6360000002 HC RX W HCPCS: Performed by: FAMILY MEDICINE

## 2024-11-21 PROCEDURE — 84484 ASSAY OF TROPONIN QUANT: CPT

## 2024-11-21 RX ORDER — ACETAMINOPHEN 325 MG/1
650 TABLET ORAL EVERY 6 HOURS PRN
Status: DISCONTINUED | OUTPATIENT
Start: 2024-11-21 | End: 2024-11-25 | Stop reason: HOSPADM

## 2024-11-21 RX ORDER — ENOXAPARIN SODIUM 100 MG/ML
40 INJECTION SUBCUTANEOUS DAILY
Status: DISCONTINUED | OUTPATIENT
Start: 2024-11-22 | End: 2024-11-25 | Stop reason: HOSPADM

## 2024-11-21 RX ORDER — METOPROLOL SUCCINATE 25 MG/1
25 TABLET, EXTENDED RELEASE ORAL DAILY
Status: DISCONTINUED | OUTPATIENT
Start: 2024-11-22 | End: 2024-11-25 | Stop reason: HOSPADM

## 2024-11-21 RX ORDER — POTASSIUM CHLORIDE 1500 MG/1
40 TABLET, EXTENDED RELEASE ORAL PRN
Status: DISCONTINUED | OUTPATIENT
Start: 2024-11-21 | End: 2024-11-25 | Stop reason: HOSPADM

## 2024-11-21 RX ORDER — 0.9 % SODIUM CHLORIDE 0.9 %
1000 INTRAVENOUS SOLUTION INTRAVENOUS ONCE
Status: COMPLETED | OUTPATIENT
Start: 2024-11-21 | End: 2024-11-21

## 2024-11-21 RX ORDER — SODIUM CHLORIDE 0.9 % (FLUSH) 0.9 %
5-40 SYRINGE (ML) INJECTION PRN
Status: DISCONTINUED | OUTPATIENT
Start: 2024-11-21 | End: 2024-11-25 | Stop reason: HOSPADM

## 2024-11-21 RX ORDER — SODIUM CHLORIDE 9 MG/ML
INJECTION, SOLUTION INTRAVENOUS PRN
Status: DISCONTINUED | OUTPATIENT
Start: 2024-11-21 | End: 2024-11-25 | Stop reason: HOSPADM

## 2024-11-21 RX ORDER — SODIUM CHLORIDE 0.9 % (FLUSH) 0.9 %
5-40 SYRINGE (ML) INJECTION EVERY 12 HOURS SCHEDULED
Status: DISCONTINUED | OUTPATIENT
Start: 2024-11-21 | End: 2024-11-25 | Stop reason: HOSPADM

## 2024-11-21 RX ORDER — MAGNESIUM SULFATE IN WATER 40 MG/ML
2000 INJECTION, SOLUTION INTRAVENOUS PRN
Status: DISCONTINUED | OUTPATIENT
Start: 2024-11-21 | End: 2024-11-25 | Stop reason: HOSPADM

## 2024-11-21 RX ORDER — ACETAMINOPHEN 650 MG/1
650 SUPPOSITORY RECTAL EVERY 6 HOURS PRN
Status: DISCONTINUED | OUTPATIENT
Start: 2024-11-21 | End: 2024-11-25 | Stop reason: HOSPADM

## 2024-11-21 RX ORDER — POTASSIUM CHLORIDE 7.45 MG/ML
10 INJECTION INTRAVENOUS PRN
Status: DISCONTINUED | OUTPATIENT
Start: 2024-11-21 | End: 2024-11-25 | Stop reason: HOSPADM

## 2024-11-21 RX ORDER — ONDANSETRON 2 MG/ML
4 INJECTION INTRAMUSCULAR; INTRAVENOUS EVERY 6 HOURS PRN
Status: DISCONTINUED | OUTPATIENT
Start: 2024-11-21 | End: 2024-11-25 | Stop reason: HOSPADM

## 2024-11-21 RX ORDER — PANTOPRAZOLE SODIUM 40 MG/1
40 TABLET, DELAYED RELEASE ORAL
Status: DISCONTINUED | OUTPATIENT
Start: 2024-11-22 | End: 2024-11-25 | Stop reason: HOSPADM

## 2024-11-21 RX ORDER — ONDANSETRON 4 MG/1
4 TABLET, ORALLY DISINTEGRATING ORAL EVERY 8 HOURS PRN
Status: DISCONTINUED | OUTPATIENT
Start: 2024-11-21 | End: 2024-11-25 | Stop reason: HOSPADM

## 2024-11-21 RX ORDER — POLYETHYLENE GLYCOL 3350 17 G/17G
17 POWDER, FOR SOLUTION ORAL DAILY PRN
Status: DISCONTINUED | OUTPATIENT
Start: 2024-11-21 | End: 2024-11-25 | Stop reason: HOSPADM

## 2024-11-21 RX ORDER — SODIUM CHLORIDE 9 MG/ML
INJECTION, SOLUTION INTRAVENOUS CONTINUOUS
Status: DISCONTINUED | OUTPATIENT
Start: 2024-11-21 | End: 2024-11-23

## 2024-11-21 RX ORDER — ATORVASTATIN CALCIUM 20 MG/1
20 TABLET, FILM COATED ORAL DAILY
Status: DISCONTINUED | OUTPATIENT
Start: 2024-11-22 | End: 2024-11-25 | Stop reason: HOSPADM

## 2024-11-21 RX ADMIN — SODIUM CHLORIDE 3000 MG: 900 INJECTION INTRAVENOUS at 22:38

## 2024-11-21 RX ADMIN — SODIUM CHLORIDE: 9 INJECTION, SOLUTION INTRAVENOUS at 22:37

## 2024-11-21 RX ADMIN — VANCOMYCIN HYDROCHLORIDE 2000 MG: 1 INJECTION, POWDER, LYOPHILIZED, FOR SOLUTION INTRAVENOUS at 18:02

## 2024-11-21 RX ADMIN — CEFEPIME 2000 MG: 2 INJECTION, POWDER, FOR SOLUTION INTRAVENOUS at 17:29

## 2024-11-21 RX ADMIN — SODIUM CHLORIDE 1000 ML: 9 INJECTION, SOLUTION INTRAVENOUS at 17:30

## 2024-11-21 RX ADMIN — SODIUM CHLORIDE, PRESERVATIVE FREE 10 ML: 5 INJECTION INTRAVENOUS at 22:38

## 2024-11-21 ASSESSMENT — PAIN SCALES - GENERAL
PAINLEVEL_OUTOF10: 0
PAINLEVEL_OUTOF10: 0

## 2024-11-21 ASSESSMENT — LIFESTYLE VARIABLES
HOW MANY STANDARD DRINKS CONTAINING ALCOHOL DO YOU HAVE ON A TYPICAL DAY: PATIENT DOES NOT DRINK
HOW OFTEN DO YOU HAVE A DRINK CONTAINING ALCOHOL: NEVER

## 2024-11-21 NOTE — ED TRIAGE NOTES
Pt arrived for possible cellulitis of right lower leg. Alert oriented and ambulatory on arrival     Denies pain at this time. States redness warmth and swelling

## 2024-11-21 NOTE — ED PROVIDER NOTES
Differential and Considerations:     Records Reviewed (source and summary of external notes): Prior medical records and Nursing notes.    Vitals:    Vitals:    11/21/24 1638 11/21/24 1658 11/21/24 1703 11/21/24 1800   BP: (!) 151/90 (!) 149/83  129/88   Pulse:       Resp:       Temp:       TempSrc:       SpO2: 96%  96% 98%   Weight:       Height:            ED COURSE  ED Course as of 11/21/24 2100   Thu Nov 21, 2024   1652 59-year-old male presents for evaluation of cellulitis to his right anterior shin.  Has tried multiple topicals as well as been on Bactrim for 1 day.  Saw dermatology nurse practitioner today who referred him to the ED for IV antibiotics.  I did bedside ultrasound but does not reveal focal collected abscess but does have significant cellulitis.  Patient does have streaking down and up his leg and the area is warm with purulent drainage.  He is tachycardic but has no other SIRS criteria at present.  Getting labs including a CBC, CMP, procalcitonin, blood cultures, lactate, troponin.  Empirically starting on IV cefepime and IV vancomycin.  IV fluid bolus 1 L is ordered.  Differential diagnosis includes osteomyelitis versus cellulitis versus phlegmon.  Plain film ordered to evaluate for osteomyelitis.  Low threshold for admission. [LW]   1804 Patient has a leukocytosis of 14.  He now meets sepsis alert criteria.  Plain film without acute osteomyelitis.  Patient will require admission for IV antibiotics. [LW]   1927 Admitted to Dr. Thomas [LW]      ED Course User Index  [LW] Neha Benjamin MD       SEPSIS Reassessment: Sepsis reassessment not applicable    Clinical Management Tools:  Not Applicable    Patient was given the following medications:  Medications   ceFEPIme (MAXIPIME) 2,000 mg in sterile water 20 mL IV syringe (2,000 mg IntraVENous Given 11/21/24 1729)   vancomycin (VANCOCIN) 2,000 mg in sodium chloride 0.9 % 500 mL IVPB (0 mg IntraVENous Stopped 11/21/24 2036)   sodium chloride 0.9 %

## 2024-11-22 ENCOUNTER — APPOINTMENT (OUTPATIENT)
Facility: HOSPITAL | Age: 59
DRG: 854 | End: 2024-11-22
Payer: COMMERCIAL

## 2024-11-22 LAB
BASOPHILS # BLD: 0.1 K/UL (ref 0–0.1)
BASOPHILS NFR BLD: 1 % (ref 0–1)
CREAT SERPL-MCNC: 0.95 MG/DL (ref 0.7–1.3)
CRP SERPL-MCNC: 16.1 MG/DL (ref 0–0.3)
DATE LAST DOSE: NORMAL
DIFFERENTIAL METHOD BLD: ABNORMAL
DOSE AMOUNT: NORMAL UNITS
EOSINOPHIL # BLD: 0 K/UL (ref 0–0.4)
EOSINOPHIL NFR BLD: 0 % (ref 0–7)
ERYTHROCYTE [DISTWIDTH] IN BLOOD BY AUTOMATED COUNT: 13.7 % (ref 11.5–14.5)
ERYTHROCYTE [SEDIMENTATION RATE] IN BLOOD: 72 MM/HR (ref 0–20)
HCT VFR BLD AUTO: 38.1 % (ref 36.6–50.3)
HGB BLD-MCNC: 12.8 G/DL (ref 12.1–17)
IMM GRANULOCYTES # BLD AUTO: 0 K/UL
IMM GRANULOCYTES NFR BLD AUTO: 0 %
LYMPHOCYTES # BLD: 0.9 K/UL (ref 0.8–3.5)
LYMPHOCYTES NFR BLD: 7 % (ref 12–49)
MCH RBC QN AUTO: 29.4 PG (ref 26–34)
MCHC RBC AUTO-ENTMCNC: 33.6 G/DL (ref 30–36.5)
MCV RBC AUTO: 87.6 FL (ref 80–99)
MONOCYTES # BLD: 1.2 K/UL (ref 0–1)
MONOCYTES NFR BLD: 10 % (ref 5–13)
MRSA DNA SPEC QL NAA+PROBE: NOT DETECTED
NEUTS SEG # BLD: 10.2 K/UL (ref 1.8–8)
NEUTS SEG NFR BLD: 82 % (ref 32–75)
NRBC # BLD: 0 K/UL (ref 0–0.01)
NRBC BLD-RTO: 0 PER 100 WBC
PLATELET # BLD AUTO: 312 K/UL (ref 150–400)
PMV BLD AUTO: 11 FL (ref 8.9–12.9)
RBC # BLD AUTO: 4.35 M/UL (ref 4.1–5.7)
RBC MORPH BLD: ABNORMAL
VANCOMYCIN TROUGH SERPL-MCNC: 5.3 UG/ML (ref 5–10)
WBC # BLD AUTO: 12.4 K/UL (ref 4.1–11.1)

## 2024-11-22 PROCEDURE — 99223 1ST HOSP IP/OBS HIGH 75: CPT | Performed by: INTERNAL MEDICINE

## 2024-11-22 PROCEDURE — 36415 COLL VENOUS BLD VENIPUNCTURE: CPT

## 2024-11-22 PROCEDURE — 2580000003 HC RX 258

## 2024-11-22 PROCEDURE — 86140 C-REACTIVE PROTEIN: CPT

## 2024-11-22 PROCEDURE — 73700 CT LOWER EXTREMITY W/O DYE: CPT

## 2024-11-22 PROCEDURE — 6370000000 HC RX 637 (ALT 250 FOR IP): Performed by: FAMILY MEDICINE

## 2024-11-22 PROCEDURE — 2580000003 HC RX 258: Performed by: FAMILY MEDICINE

## 2024-11-22 PROCEDURE — 99254 IP/OBS CNSLTJ NEW/EST MOD 60: CPT

## 2024-11-22 PROCEDURE — 6360000002 HC RX W HCPCS: Performed by: FAMILY MEDICINE

## 2024-11-22 PROCEDURE — 82565 ASSAY OF CREATININE: CPT

## 2024-11-22 PROCEDURE — 87641 MR-STAPH DNA AMP PROBE: CPT

## 2024-11-22 PROCEDURE — 85652 RBC SED RATE AUTOMATED: CPT

## 2024-11-22 PROCEDURE — 85025 COMPLETE CBC W/AUTO DIFF WBC: CPT

## 2024-11-22 PROCEDURE — 1100000000 HC RM PRIVATE

## 2024-11-22 PROCEDURE — 6360000002 HC RX W HCPCS

## 2024-11-22 PROCEDURE — 80202 ASSAY OF VANCOMYCIN: CPT

## 2024-11-22 RX ADMIN — PIPERACILLIN AND TAZOBACTAM 4500 MG: 4; .5 INJECTION, POWDER, FOR SOLUTION INTRAVENOUS at 15:07

## 2024-11-22 RX ADMIN — PANTOPRAZOLE SODIUM 40 MG: 40 TABLET, DELAYED RELEASE ORAL at 06:08

## 2024-11-22 RX ADMIN — VANCOMYCIN HYDROCHLORIDE 1000 MG: 1 INJECTION, POWDER, LYOPHILIZED, FOR SOLUTION INTRAVENOUS at 16:33

## 2024-11-22 RX ADMIN — SODIUM CHLORIDE 3000 MG: 900 INJECTION INTRAVENOUS at 08:19

## 2024-11-22 RX ADMIN — SODIUM CHLORIDE 3000 MG: 900 INJECTION INTRAVENOUS at 02:58

## 2024-11-22 RX ADMIN — ENOXAPARIN SODIUM 40 MG: 100 INJECTION SUBCUTANEOUS at 08:15

## 2024-11-22 RX ADMIN — METOPROLOL SUCCINATE 25 MG: 25 TABLET, EXTENDED RELEASE ORAL at 08:15

## 2024-11-22 RX ADMIN — VANCOMYCIN HYDROCHLORIDE 1000 MG: 1 INJECTION, POWDER, LYOPHILIZED, FOR SOLUTION INTRAVENOUS at 06:07

## 2024-11-22 RX ADMIN — ATORVASTATIN CALCIUM 20 MG: 20 TABLET, FILM COATED ORAL at 08:15

## 2024-11-22 RX ADMIN — SODIUM CHLORIDE, PRESERVATIVE FREE 10 ML: 5 INJECTION INTRAVENOUS at 08:16

## 2024-11-22 RX ADMIN — ACETAMINOPHEN 650 MG: 325 TABLET ORAL at 16:35

## 2024-11-22 ASSESSMENT — PAIN DESCRIPTION - LOCATION: LOCATION: HEAD

## 2024-11-22 ASSESSMENT — PAIN SCALES - GENERAL
PAINLEVEL_OUTOF10: 0
PAINLEVEL_OUTOF10: 5
PAINLEVEL_OUTOF10: 0

## 2024-11-22 ASSESSMENT — PAIN DESCRIPTION - ORIENTATION: ORIENTATION: ANTERIOR

## 2024-11-22 ASSESSMENT — PAIN DESCRIPTION - DESCRIPTORS: DESCRIPTORS: ACHING

## 2024-11-22 NOTE — H&P
40 Minutes    Critical Care Provided     Minutes non procedure based    Signed: Jana Mir MD    Procedures: see electronic medical records for all procedures/Xrays and details which were not copied into this note but were reviewed prior to creation of Plan.

## 2024-11-22 NOTE — CONSULTS
ORTHOPEDIC CONSULT    Patient: Celestine Torres MRN: [id number]  SSN: [id number]    YOB: 1965  Age: 59 y.o.  Sex: male      Subjective:      Celestine Torres is a 59 y.o. male with past medical history of hypertension, hypercholesterolemia who is being seen for right leg cellulitis/abscess.  Patient reports about 2 weeks ago he began to have a small bump along the right lower leg.  He reports he was seen by dermatology for this issue.  He states he was prescribed topical ointment for this, according to paperwork that patient has with him he was prescribed betamethasone cream.  He reports a few days after applying the cream is when he noticed the area began to grow in size and become painful and red.  Due to the pain the patient states he was seen in urgent care 2 days ago.  While there he was diagnosed with cellulitis and they prescribed him Bactrim for which he has only taken 2 doses.  He returned back to his dermatology office and was advised that he needed to come to the ED and be admitted for IV antibiotics.  Patient was transported to [facility] ED.  While in the ED x-rays of the right tibia/fibula were obtained which revealed diffuse soft tissue swelling no focal osteopenia or erosions to suggest osteomyelitis.  A bedside ultrasound was also performed which did not reveal a fluid collection along the anterior shin.  Patient received 1 dose of IV cefepime while in the ED.  Patient was then admitted to the floor and started on IV vancomycin and IV Zosyn.      Of note patient reports history of an abscess to the right foot in the past as well as an abscess along the gluteal region.  He reports he was seen in urgent care for these and they were I&D and he was given oral antibiotics which he did well with.  He reports these abscesses came in the last 2 years and does not have a prior history of them prior to 2022.      Denies any recent falls or injuries to the skin, denies any bug 
Consult Note            Date:11/22/2024        Patient Name:Celestine Torres     YOB: 1965     Age:59 y.o.    Consults Infectious Disease     Chief Complaint     Chief Complaint   Patient presents with    Wound Check      Abscess cellulitis right leg    History Obtained From   patient    History of Present Illness   This is a 59 year old male followed in the past by Podiatry for cellulitis right lower extremity with previous abscess of his right foot secondary to MRSA.  He was apparently seen in Urgent Care 2 days PTA and prescribed Bactrim. He presented to the ED yesterday with same concerns marked by redness, warmth and swelling right leg. He had low grade temperature.  He had elevated WBC, procal, CRP and ESR. XR showed diffuse soft tissue swelling around the knee, calf, and ankle. But no focal osteopenia or erosion to suggest osteomyelitis.  CT scan showed fluid collection in the anterior mid lower leg soft tissues, concerning for abscess; associated skin thickening and irregularity with diffuse cellulitis. Blood cultures were sent and  he was started on Vancomycin and Zosyn.  ID has been consulted for this reason.  He was seen by Orthopedics who recommended IR percutaneous drainage, however, IR felt that there was not enough tissue to hold a percutaneous drain in place and recommended I&D  or surgical debridement.       Patient resting comfortably.  States that this is first infection involving his right shin.  Other infections involved his foot and buttock.  Past Medical History     Past Medical History:   Diagnosis Date    Hypercholesteremia     Hypertension         Past Surgical History   No past surgical history on file.     Medications     Prior to Admission medications    Medication Sig Start Date End Date Taking? Authorizing Provider   metoprolol succinate (TOPROL XL) 25 MG extended release tablet  7/14/23  Yes Provider, MD Nayan   lovastatin (MEVACOR) 40 MG tablet Take 1 tablet by 
tissues, concerning for  abscess; associated skin thickening and irregularity. Diffuse cellulitis.    Electronically signed by JENNIE RAO      LABS  Lab Results   Component Value Date/Time    WBC 12.4 11/22/2024 09:54 AM    HGB 12.8 11/22/2024 09:54 AM    HCT 38.1 11/22/2024 09:54 AM     11/22/2024 09:54 AM    MCV 87.6 11/22/2024 09:54 AM     Lab Results   Component Value Date/Time     11/21/2024 05:22 PM    K 3.8 11/21/2024 05:22 PM     11/21/2024 05:22 PM    CO2 25 11/21/2024 05:22 PM    BUN 22 11/21/2024 05:22 PM     No results found for: \"INR\", \"PT1\"      ASSESSMENT / PLAN   Chart and imaging reviewed.  There is a small, superficial collection just underneath the skin in the mid anterior lower leg.  There is not enough tissue to hold a percutaneous drain in-place.  Recommend bedside I&D vs surgical debridement by Orthopaedics or General Surgery.      Case reviewed with Dr. Rafael Oquendo.    Thank you for asking us to participate in the care of Celestine Torres.      PADILLA GROVE JR, APRN - NP  Formerly [facility]      CC:  Tanvi Sin PA-C

## 2024-11-22 NOTE — CARE COORDINATION
11/22/24 1335   Service Assessment   Patient Orientation Alert and Oriented   Cognition Alert   History Provided By Patient   Primary Caregiver Self   Support Systems Family Members   Patient's Healthcare Decision Maker is: Legal Next of Kin   PCP Verified by CM Yes   Last Visit to PCP Within last 3 months   Prior Functional Level Independent in ADLs/IADLs   Current Functional Level Independent in ADLs/IADLs   Can patient return to prior living arrangement Yes   Ability to make needs known: Good   Family able to assist with home care needs: Yes   Would you like for me to discuss the discharge plan with any other family members/significant others, and if so, who? Yes   Financial Resources Other (Comment)  ([employer] via employer)   Community Resources None   Social/Functional History   Lives With Significant other     Patient confirmed demographics.  Last seen at listed PCP approx three mos ago.  Was seeing another provider in Dr. Charles office, and will start seeing listed PCP (Araceli) on 5 Dec 2024.  Uitlizes [facility] ([place]).  Independent w/all ADL's & IADL's and drives.  No home O2 or DME.  Patient will drive himself home when medically cleared.       Advance Care Planning     General Advance Care Planning (ACP) Conversation    Date of Conversation: 11/22/2024  Conducted with: Patient with Decision Making Capacity  Other persons present: None    Healthcare Decision Maker: No healthcare decision makers have been documented.       Content/Action Overview:  Has ACP document(s) on file - reflects the patient's care preferences  Reviewed DNR/DNI and patient elects Full Code (Attempt Resuscitation)        Length of Voluntary ACP Conversation in minutes:  <16 minutes (Non-Billable)             SAJAN Barrow

## 2024-11-22 NOTE — PLAN OF CARE
Problem: Discharge Planning  Goal: Discharge to home or other facility with appropriate resources  11/22/2024 1000 by Areli Bernard, RN  Outcome: Progressing  11/21/2024 2350 by Irina Nolan, RN  Outcome: Progressing  Flowsheets (Taken 11/21/2024 2200)  Discharge to home or other facility with appropriate resources: Identify barriers to discharge with patient and caregiver     Problem: Pain  Goal: Verbalizes/displays adequate comfort level or baseline comfort level  11/22/2024 1000 by Areli Bernard, RN  Outcome: Progressing  11/21/2024 2350 by Irina Nolan, RN  Outcome: Progressing

## 2024-11-22 NOTE — ED NOTES
administration in time range)     Or   acetaminophen (TYLENOL) suppository 650 mg (has no administration in time range)   ampicillin-sulbactam (UNASYN) 3,000 mg in sodium chloride 0.9 % 100 mL IVPB (mini-bag) (has no administration in time range)   pantoprazole (PROTONIX) tablet 40 mg (has no administration in time range)   ceFEPIme (MAXIPIME) 2,000 mg in sterile water 20 mL IV syringe (2,000 mg IntraVENous Given 11/21/24 1729)   vancomycin (VANCOCIN) 2,000 mg in sodium chloride 0.9 % 500 mL IVPB (0 mg IntraVENous Stopped 11/21/24 2036)   sodium chloride 0.9 % bolus 1,000 mL (0 mLs IntraVENous Stopped 11/21/24 2036)     Last documented pain medication administration: n/a  Pertinent or High Risk Medications/Drips: no   If Yes, please provide details: n/a  Blood Product Administration: no  If Yes, please provide details: n/a  Process Protocols/Bundles: N/A    Recommendation  Incomplete STAT orders: n/a  Overdue Medications: n/a  Patient Belongings:    Additional Comments: maintenance fluids and antibiotics not approved by pharmacy at this time   If any further questions, please call Sending RN at [phone]      Admitting Unit Notification  Name of person notified and time: Emilia RAMIREZ @ [id number]      Electronically signed by: Electronically signed by Amber Benjamin RN on 11/21/2024 at 9:26 PM

## 2024-11-22 NOTE — WOUND CARE
Wound Care Note:      Wound Care in to see patient for right pretibial wound. Wound appears raised, swollen and slightly reddened consistent with an abscess. There was a small area of dried blood, but otherwise no appearance of drainage. As of now, a foam to cover the wound will be appropriate, changed daily and PRN for soiling. If wound starts draining, making just a foam no longer appropriate, please reconsult WC.  Per Dr. Mir's note, orthopedic surgery has been consulted for an I&D. No doctor was named on the consult, so [facility] followed up with floor RN to make sure consult is appropriately relayed.        11/22/24 0910   Wound 11/21/24 Pretibial Right   Date First Assessed/Time First Assessed: 11/21/24 2200   Present on Original Admission: Yes  Primary Wound Type: (c) Other (comment)  Location: Pretibial  Wound Location Orientation: Right   Wound Image    Wound Etiology Other  (Infection)   Dressing Status New dressing applied;Clean;Dry;Intact   Wound Cleansed Cleansed with saline   Dressing/Treatment Foam   Wound Assessment Pink/red;Other (Comment)  (swelling)   Drainage Amount Scant (moist but unmeasurable)   Drainage Description Sanguinous   Odor None   Yu-wound Assessment Intact;Blanchable erythema         Skin Care & Pressure Relief Recommendations:  Minimize layers of linen  Pads under patient to optimize support surface and microclimate  Turn/Reposition approximately every 2 hours  Pillow/Wedge  Offload heels with pillows or offloading boots      Discussed above plan with  Areli RAMIREZ

## 2024-11-23 ENCOUNTER — ANESTHESIA (OUTPATIENT)
Facility: HOSPITAL | Age: 59
End: 2024-11-23
Payer: COMMERCIAL

## 2024-11-23 ENCOUNTER — ANESTHESIA EVENT (OUTPATIENT)
Facility: HOSPITAL | Age: 59
End: 2024-11-23
Payer: COMMERCIAL

## 2024-11-23 PROBLEM — L02.415 CELLULITIS AND ABSCESS OF RIGHT LEG: Status: ACTIVE | Noted: 2024-11-23

## 2024-11-23 PROBLEM — L03.115 CELLULITIS AND ABSCESS OF RIGHT LEG: Status: ACTIVE | Noted: 2024-11-23

## 2024-11-23 PROBLEM — A41.9 SEPSIS WITHOUT ACUTE ORGAN DYSFUNCTION (HCC): Status: ACTIVE | Noted: 2024-11-23

## 2024-11-23 LAB
ANION GAP SERPL CALC-SCNC: 2 MMOL/L (ref 2–12)
BASOPHILS # BLD: 0 K/UL (ref 0–0.1)
BASOPHILS NFR BLD: 0 % (ref 0–1)
BUN SERPL-MCNC: 11 MG/DL (ref 6–20)
BUN/CREAT SERPL: 15 (ref 12–20)
CA-I BLD-MCNC: 8.6 MG/DL (ref 8.5–10.1)
CHLORIDE SERPL-SCNC: 115 MMOL/L (ref 97–108)
CO2 SERPL-SCNC: 26 MMOL/L (ref 21–32)
CREAT SERPL-MCNC: 0.74 MG/DL (ref 0.7–1.3)
CRP SERPL-MCNC: 8.31 MG/DL (ref 0–0.3)
DIFFERENTIAL METHOD BLD: ABNORMAL
EOSINOPHIL # BLD: 0 K/UL (ref 0–0.4)
EOSINOPHIL NFR BLD: 0 % (ref 0–7)
ERYTHROCYTE [DISTWIDTH] IN BLOOD BY AUTOMATED COUNT: 13.8 % (ref 11.5–14.5)
ERYTHROCYTE [SEDIMENTATION RATE] IN BLOOD: 47 MM/HR (ref 0–20)
GLUCOSE SERPL-MCNC: 109 MG/DL (ref 65–100)
HCT VFR BLD AUTO: 35.2 % (ref 36.6–50.3)
HGB BLD-MCNC: 11.7 G/DL (ref 12.1–17)
IMM GRANULOCYTES # BLD AUTO: 0 K/UL
IMM GRANULOCYTES NFR BLD AUTO: 0 %
LYMPHOCYTES # BLD: 1.1 K/UL (ref 0.8–3.5)
LYMPHOCYTES NFR BLD: 11 % (ref 12–49)
MCH RBC QN AUTO: 29 PG (ref 26–34)
MCHC RBC AUTO-ENTMCNC: 33.2 G/DL (ref 30–36.5)
MCV RBC AUTO: 87.1 FL (ref 80–99)
METAMYELOCYTES NFR BLD MANUAL: 1 %
MONOCYTES # BLD: 0.6 K/UL (ref 0–1)
MONOCYTES NFR BLD: 6 % (ref 5–13)
MRSA DNA SPEC QL NAA+PROBE: DETECTED
NEUTS BAND NFR BLD MANUAL: 1 % (ref 0–6)
NEUTS SEG # BLD: 8.2 K/UL (ref 1.8–8)
NEUTS SEG NFR BLD: 81 % (ref 32–75)
NRBC # BLD: 0 K/UL (ref 0–0.01)
NRBC BLD-RTO: 0 PER 100 WBC
PLATELET # BLD AUTO: 300 K/UL (ref 150–400)
PMV BLD AUTO: 10.8 FL (ref 8.9–12.9)
POTASSIUM SERPL-SCNC: 3.9 MMOL/L (ref 3.5–5.1)
PROCALCITONIN SERPL-MCNC: <0.05 NG/ML
RBC # BLD AUTO: 4.04 M/UL (ref 4.1–5.7)
RBC MORPH BLD: ABNORMAL
SODIUM SERPL-SCNC: 143 MMOL/L (ref 136–145)
WBC # BLD AUTO: 10 K/UL (ref 4.1–11.1)

## 2024-11-23 PROCEDURE — 0JBN0ZZ EXCISION OF RIGHT LOWER LEG SUBCUTANEOUS TISSUE AND FASCIA, OPEN APPROACH: ICD-10-PCS | Performed by: ORTHOPAEDIC SURGERY

## 2024-11-23 PROCEDURE — 7100000000 HC PACU RECOVERY - FIRST 15 MIN: Performed by: ORTHOPAEDIC SURGERY

## 2024-11-23 PROCEDURE — 2500000003 HC RX 250 WO HCPCS: Performed by: NURSE ANESTHETIST, CERTIFIED REGISTERED

## 2024-11-23 PROCEDURE — 6360000002 HC RX W HCPCS: Performed by: NURSE ANESTHETIST, CERTIFIED REGISTERED

## 2024-11-23 PROCEDURE — 87070 CULTURE OTHR SPECIMN AEROBIC: CPT

## 2024-11-23 PROCEDURE — 3700000000 HC ANESTHESIA ATTENDED CARE: Performed by: ORTHOPAEDIC SURGERY

## 2024-11-23 PROCEDURE — 84145 PROCALCITONIN (PCT): CPT

## 2024-11-23 PROCEDURE — 3600000002 HC SURGERY LEVEL 2 BASE: Performed by: ORTHOPAEDIC SURGERY

## 2024-11-23 PROCEDURE — 80048 BASIC METABOLIC PNL TOTAL CA: CPT

## 2024-11-23 PROCEDURE — 1100000000 HC RM PRIVATE

## 2024-11-23 PROCEDURE — 6370000000 HC RX 637 (ALT 250 FOR IP): Performed by: FAMILY MEDICINE

## 2024-11-23 PROCEDURE — 87205 SMEAR GRAM STAIN: CPT

## 2024-11-23 PROCEDURE — 7100000001 HC PACU RECOVERY - ADDTL 15 MIN: Performed by: ORTHOPAEDIC SURGERY

## 2024-11-23 PROCEDURE — 87641 MR-STAPH DNA AMP PROBE: CPT

## 2024-11-23 PROCEDURE — 2580000003 HC RX 258

## 2024-11-23 PROCEDURE — 2709999900 HC NON-CHARGEABLE SUPPLY: Performed by: ORTHOPAEDIC SURGERY

## 2024-11-23 PROCEDURE — 6360000002 HC RX W HCPCS: Performed by: ORTHOPAEDIC SURGERY

## 2024-11-23 PROCEDURE — 87176 TISSUE HOMOGENIZATION CULTR: CPT

## 2024-11-23 PROCEDURE — 6360000002 HC RX W HCPCS

## 2024-11-23 PROCEDURE — 6370000000 HC RX 637 (ALT 250 FOR IP): Performed by: INTERNAL MEDICINE

## 2024-11-23 PROCEDURE — 86140 C-REACTIVE PROTEIN: CPT

## 2024-11-23 PROCEDURE — 87077 CULTURE AEROBIC IDENTIFY: CPT

## 2024-11-23 PROCEDURE — 87075 CULTR BACTERIA EXCEPT BLOOD: CPT

## 2024-11-23 PROCEDURE — 87186 SC STD MICRODIL/AGAR DIL: CPT

## 2024-11-23 PROCEDURE — 3700000001 HC ADD 15 MINUTES (ANESTHESIA): Performed by: ORTHOPAEDIC SURGERY

## 2024-11-23 PROCEDURE — 85652 RBC SED RATE AUTOMATED: CPT

## 2024-11-23 PROCEDURE — 85025 COMPLETE CBC W/AUTO DIFF WBC: CPT

## 2024-11-23 PROCEDURE — 3600000012 HC SURGERY LEVEL 2 ADDTL 15MIN: Performed by: ORTHOPAEDIC SURGERY

## 2024-11-23 PROCEDURE — 99232 SBSQ HOSP IP/OBS MODERATE 35: CPT | Performed by: INTERNAL MEDICINE

## 2024-11-23 PROCEDURE — 2580000003 HC RX 258: Performed by: FAMILY MEDICINE

## 2024-11-23 PROCEDURE — 2580000003 HC RX 258: Performed by: NURSE ANESTHETIST, CERTIFIED REGISTERED

## 2024-11-23 RX ORDER — OXYCODONE HYDROCHLORIDE 5 MG/1
5 TABLET ORAL PRN
Status: DISCONTINUED | OUTPATIENT
Start: 2024-11-23 | End: 2024-11-23 | Stop reason: HOSPADM

## 2024-11-23 RX ORDER — PROPOFOL 10 MG/ML
INJECTION, EMULSION INTRAVENOUS
Status: DISCONTINUED | OUTPATIENT
Start: 2024-11-23 | End: 2024-11-23 | Stop reason: SDUPTHER

## 2024-11-23 RX ORDER — LORAZEPAM 2 MG/ML
0.5 INJECTION INTRAMUSCULAR
Status: DISCONTINUED | OUTPATIENT
Start: 2024-11-23 | End: 2024-11-23 | Stop reason: HOSPADM

## 2024-11-23 RX ORDER — DEXMEDETOMIDINE HYDROCHLORIDE 100 UG/ML
INJECTION, SOLUTION INTRAVENOUS
Status: DISCONTINUED | OUTPATIENT
Start: 2024-11-23 | End: 2024-11-23 | Stop reason: SDUPTHER

## 2024-11-23 RX ORDER — LIDOCAINE 4 G/G
1 PATCH TOPICAL AS NEEDED
Status: DISCONTINUED | OUTPATIENT
Start: 2024-11-23 | End: 2024-11-23 | Stop reason: HOSPADM

## 2024-11-23 RX ORDER — DEXTROSE MONOHYDRATE 100 MG/ML
INJECTION, SOLUTION INTRAVENOUS CONTINUOUS PRN
Status: DISCONTINUED | OUTPATIENT
Start: 2024-11-23 | End: 2024-11-23 | Stop reason: HOSPADM

## 2024-11-23 RX ORDER — LABETALOL HYDROCHLORIDE 5 MG/ML
10 INJECTION, SOLUTION INTRAVENOUS
Status: DISCONTINUED | OUTPATIENT
Start: 2024-11-23 | End: 2024-11-23 | Stop reason: HOSPADM

## 2024-11-23 RX ORDER — ONDANSETRON 2 MG/ML
INJECTION INTRAMUSCULAR; INTRAVENOUS
Status: DISCONTINUED | OUTPATIENT
Start: 2024-11-23 | End: 2024-11-23 | Stop reason: SDUPTHER

## 2024-11-23 RX ORDER — SODIUM CHLORIDE 9 MG/ML
INJECTION, SOLUTION INTRAVENOUS PRN
Status: DISCONTINUED | OUTPATIENT
Start: 2024-11-23 | End: 2024-11-23 | Stop reason: HOSPADM

## 2024-11-23 RX ORDER — HYDRALAZINE HYDROCHLORIDE 20 MG/ML
10 INJECTION INTRAMUSCULAR; INTRAVENOUS
Status: DISCONTINUED | OUTPATIENT
Start: 2024-11-23 | End: 2024-11-23 | Stop reason: HOSPADM

## 2024-11-23 RX ORDER — MUPIROCIN 20 MG/G
OINTMENT TOPICAL 2 TIMES DAILY
Status: DISCONTINUED | OUTPATIENT
Start: 2024-11-23 | End: 2024-11-25 | Stop reason: HOSPADM

## 2024-11-23 RX ORDER — MIDAZOLAM HYDROCHLORIDE 1 MG/ML
INJECTION, SOLUTION INTRAMUSCULAR; INTRAVENOUS
Status: DISCONTINUED | OUTPATIENT
Start: 2024-11-23 | End: 2024-11-23 | Stop reason: SDUPTHER

## 2024-11-23 RX ORDER — MEPERIDINE HYDROCHLORIDE 25 MG/ML
12.5 INJECTION INTRAMUSCULAR; INTRAVENOUS; SUBCUTANEOUS EVERY 5 MIN PRN
Status: DISCONTINUED | OUTPATIENT
Start: 2024-11-23 | End: 2024-11-23 | Stop reason: HOSPADM

## 2024-11-23 RX ORDER — VANCOMYCIN HYDROCHLORIDE 1 G/20ML
INJECTION, POWDER, LYOPHILIZED, FOR SOLUTION INTRAVENOUS PRN
Status: DISCONTINUED | OUTPATIENT
Start: 2024-11-23 | End: 2024-11-23 | Stop reason: ALTCHOICE

## 2024-11-23 RX ORDER — GLUCAGON 1 MG/ML
1 KIT INJECTION PRN
Status: DISCONTINUED | OUTPATIENT
Start: 2024-11-23 | End: 2024-11-23 | Stop reason: HOSPADM

## 2024-11-23 RX ORDER — OXYCODONE HYDROCHLORIDE 5 MG/1
10 TABLET ORAL PRN
Status: DISCONTINUED | OUTPATIENT
Start: 2024-11-23 | End: 2024-11-23 | Stop reason: HOSPADM

## 2024-11-23 RX ORDER — HYDROMORPHONE HYDROCHLORIDE 1 MG/ML
0.5 INJECTION, SOLUTION INTRAMUSCULAR; INTRAVENOUS; SUBCUTANEOUS EVERY 5 MIN PRN
Status: DISCONTINUED | OUTPATIENT
Start: 2024-11-23 | End: 2024-11-23 | Stop reason: HOSPADM

## 2024-11-23 RX ORDER — FENTANYL CITRATE 50 UG/ML
INJECTION, SOLUTION INTRAMUSCULAR; INTRAVENOUS
Status: DISCONTINUED | OUTPATIENT
Start: 2024-11-23 | End: 2024-11-23 | Stop reason: SDUPTHER

## 2024-11-23 RX ORDER — DIPHENHYDRAMINE HYDROCHLORIDE 50 MG/ML
12.5 INJECTION INTRAMUSCULAR; INTRAVENOUS
Status: DISCONTINUED | OUTPATIENT
Start: 2024-11-23 | End: 2024-11-23 | Stop reason: HOSPADM

## 2024-11-23 RX ORDER — ONDANSETRON 2 MG/ML
4 INJECTION INTRAMUSCULAR; INTRAVENOUS
Status: DISCONTINUED | OUTPATIENT
Start: 2024-11-23 | End: 2024-11-23 | Stop reason: HOSPADM

## 2024-11-23 RX ORDER — METOCLOPRAMIDE HYDROCHLORIDE 5 MG/ML
10 INJECTION INTRAMUSCULAR; INTRAVENOUS
Status: DISCONTINUED | OUTPATIENT
Start: 2024-11-23 | End: 2024-11-23 | Stop reason: HOSPADM

## 2024-11-23 RX ORDER — IPRATROPIUM BROMIDE AND ALBUTEROL SULFATE 2.5; .5 MG/3ML; MG/3ML
1 SOLUTION RESPIRATORY (INHALATION)
Status: DISCONTINUED | OUTPATIENT
Start: 2024-11-23 | End: 2024-11-23 | Stop reason: HOSPADM

## 2024-11-23 RX ORDER — DEXAMETHASONE SODIUM PHOSPHATE 4 MG/ML
INJECTION, SOLUTION INTRA-ARTICULAR; INTRALESIONAL; INTRAMUSCULAR; INTRAVENOUS; SOFT TISSUE
Status: DISCONTINUED | OUTPATIENT
Start: 2024-11-23 | End: 2024-11-23 | Stop reason: SDUPTHER

## 2024-11-23 RX ORDER — SODIUM CHLORIDE, SODIUM LACTATE, POTASSIUM CHLORIDE, CALCIUM CHLORIDE 600; 310; 30; 20 MG/100ML; MG/100ML; MG/100ML; MG/100ML
INJECTION, SOLUTION INTRAVENOUS
Status: DISCONTINUED | OUTPATIENT
Start: 2024-11-23 | End: 2024-11-23 | Stop reason: SDUPTHER

## 2024-11-23 RX ORDER — SODIUM CHLORIDE, SODIUM LACTATE, POTASSIUM CHLORIDE, CALCIUM CHLORIDE 600; 310; 30; 20 MG/100ML; MG/100ML; MG/100ML; MG/100ML
INJECTION, SOLUTION INTRAVENOUS ONCE
Status: DISCONTINUED | OUTPATIENT
Start: 2024-11-23 | End: 2024-11-23 | Stop reason: HOSPADM

## 2024-11-23 RX ORDER — NALOXONE HYDROCHLORIDE 0.4 MG/ML
INJECTION, SOLUTION INTRAMUSCULAR; INTRAVENOUS; SUBCUTANEOUS PRN
Status: DISCONTINUED | OUTPATIENT
Start: 2024-11-23 | End: 2024-11-23 | Stop reason: HOSPADM

## 2024-11-23 RX ORDER — SODIUM CHLORIDE 0.9 % (FLUSH) 0.9 %
5-40 SYRINGE (ML) INJECTION EVERY 12 HOURS SCHEDULED
Status: DISCONTINUED | OUTPATIENT
Start: 2024-11-23 | End: 2024-11-23 | Stop reason: HOSPADM

## 2024-11-23 RX ORDER — FENTANYL CITRATE 0.05 MG/ML
50 INJECTION, SOLUTION INTRAMUSCULAR; INTRAVENOUS EVERY 5 MIN PRN
Status: DISCONTINUED | OUTPATIENT
Start: 2024-11-23 | End: 2024-11-23 | Stop reason: HOSPADM

## 2024-11-23 RX ORDER — SODIUM CHLORIDE 0.9 % (FLUSH) 0.9 %
5-40 SYRINGE (ML) INJECTION PRN
Status: DISCONTINUED | OUTPATIENT
Start: 2024-11-23 | End: 2024-11-23 | Stop reason: HOSPADM

## 2024-11-23 RX ADMIN — DEXAMETHASONE SODIUM PHOSPHATE 4 MG: 4 INJECTION, SOLUTION INTRA-ARTICULAR; INTRALESIONAL; INTRAMUSCULAR; INTRAVENOUS; SOFT TISSUE at 13:58

## 2024-11-23 RX ADMIN — MUPIROCIN: 20 OINTMENT TOPICAL at 21:55

## 2024-11-23 RX ADMIN — SODIUM CHLORIDE: 9 INJECTION, SOLUTION INTRAVENOUS at 18:14

## 2024-11-23 RX ADMIN — PROPOFOL 50 MG: 10 INJECTION, EMULSION INTRAVENOUS at 14:03

## 2024-11-23 RX ADMIN — PROPOFOL 30 MG: 10 INJECTION, EMULSION INTRAVENOUS at 13:43

## 2024-11-23 RX ADMIN — VANCOMYCIN HYDROCHLORIDE 1000 MG: 1 INJECTION, POWDER, LYOPHILIZED, FOR SOLUTION INTRAVENOUS at 18:14

## 2024-11-23 RX ADMIN — SODIUM CHLORIDE, PRESERVATIVE FREE 10 ML: 5 INJECTION INTRAVENOUS at 22:12

## 2024-11-23 RX ADMIN — ACETAMINOPHEN 650 MG: 325 TABLET ORAL at 18:16

## 2024-11-23 RX ADMIN — PROPOFOL 50 MG: 10 INJECTION, EMULSION INTRAVENOUS at 13:38

## 2024-11-23 RX ADMIN — SODIUM CHLORIDE, POTASSIUM CHLORIDE, SODIUM LACTATE AND CALCIUM CHLORIDE: 600; 310; 30; 20 INJECTION, SOLUTION INTRAVENOUS at 13:21

## 2024-11-23 RX ADMIN — DEXMEDETOMIDINE 20 MCG: 100 INJECTION, SOLUTION INTRAVENOUS at 13:55

## 2024-11-23 RX ADMIN — PROPOFOL 50 MG: 10 INJECTION, EMULSION INTRAVENOUS at 13:49

## 2024-11-23 RX ADMIN — PROPOFOL 30 MG: 10 INJECTION, EMULSION INTRAVENOUS at 13:45

## 2024-11-23 RX ADMIN — VANCOMYCIN HYDROCHLORIDE 1000 MG: 1 INJECTION, POWDER, LYOPHILIZED, FOR SOLUTION INTRAVENOUS at 10:07

## 2024-11-23 RX ADMIN — PROPOFOL 50 MG: 10 INJECTION, EMULSION INTRAVENOUS at 13:42

## 2024-11-23 RX ADMIN — METOPROLOL SUCCINATE 25 MG: 25 TABLET, EXTENDED RELEASE ORAL at 07:58

## 2024-11-23 RX ADMIN — PROPOFOL 50 MG: 10 INJECTION, EMULSION INTRAVENOUS at 13:35

## 2024-11-23 RX ADMIN — PROPOFOL 50 MG: 10 INJECTION, EMULSION INTRAVENOUS at 13:29

## 2024-11-23 RX ADMIN — PROPOFOL 50 MG: 10 INJECTION, EMULSION INTRAVENOUS at 13:51

## 2024-11-23 RX ADMIN — FENTANYL CITRATE 100 MCG: 50 INJECTION INTRAMUSCULAR; INTRAVENOUS at 13:27

## 2024-11-23 RX ADMIN — PROPOFOL 50 MG: 10 INJECTION, EMULSION INTRAVENOUS at 14:00

## 2024-11-23 RX ADMIN — PROPOFOL 40 MG: 10 INJECTION, EMULSION INTRAVENOUS at 13:47

## 2024-11-23 RX ADMIN — VANCOMYCIN HYDROCHLORIDE 1000 MG: 1 INJECTION, POWDER, LYOPHILIZED, FOR SOLUTION INTRAVENOUS at 03:04

## 2024-11-23 RX ADMIN — ONDANSETRON 4 MG: 2 INJECTION INTRAMUSCULAR; INTRAVENOUS at 13:58

## 2024-11-23 RX ADMIN — ATORVASTATIN CALCIUM 20 MG: 20 TABLET, FILM COATED ORAL at 07:58

## 2024-11-23 RX ADMIN — MIDAZOLAM 2 MG: 1 INJECTION INTRAMUSCULAR; INTRAVENOUS at 13:21

## 2024-11-23 RX ADMIN — SODIUM CHLORIDE, PRESERVATIVE FREE 10 ML: 5 INJECTION INTRAVENOUS at 07:58

## 2024-11-23 RX ADMIN — PANTOPRAZOLE SODIUM 40 MG: 40 TABLET, DELAYED RELEASE ORAL at 06:09

## 2024-11-23 ASSESSMENT — PAIN SCALES - GENERAL
PAINLEVEL_OUTOF10: 0
PAINLEVEL_OUTOF10: 0
PAINLEVEL_OUTOF10: 2
PAINLEVEL_OUTOF10: 0

## 2024-11-23 ASSESSMENT — PAIN DESCRIPTION - LOCATION: LOCATION: LEG

## 2024-11-23 ASSESSMENT — PAIN DESCRIPTION - ORIENTATION: ORIENTATION: RIGHT

## 2024-11-23 NOTE — PLAN OF CARE
Problem: Discharge Planning  Goal: Discharge to home or other facility with appropriate resources  Outcome: Progressing  Flowsheets (Taken 11/22/2024 2010)  Discharge to home or other facility with appropriate resources:   Identify barriers to discharge with patient and caregiver   Arrange for needed discharge resources and transportation as appropriate   Identify discharge learning needs (meds, wound care, etc)     Problem: Pain  Goal: Verbalizes/displays adequate comfort level or baseline comfort level  Outcome: Progressing

## 2024-11-23 NOTE — ANESTHESIA PRE PROCEDURE
results found for: \"COVID19\"        Anesthesia Evaluation  Patient summary reviewed and Nursing notes reviewed  Airway: Mallampati: II  TM distance: >3 FB   Neck ROM: full  Mouth opening: > = 3 FB   Dental:    (+) poor dentition      Pulmonary:Negative Pulmonary ROS and normal exam  breath sounds clear to auscultation                             Cardiovascular:    (+) hypertension:, hyperlipidemia      ECG reviewed  Rhythm: regular  Rate: abnormal                    Neuro/Psych:   Negative Neuro/Psych ROS              GI/Hepatic/Renal: Neg GI/Hepatic/Renal ROS            Endo/Other: Negative Endo/Other ROS                    Abdominal:             Vascular: negative vascular ROS.         Other Findings:         Anesthesia Plan      MAC and TIVA     ASA 2 - emergent     (Standard ASA monitors: continuous EKG, BP, HR, pulse oximeter, temperature, and capnography.)  Induction: intravenous.    MIPS: Postoperative opioids intended and Prophylactic antiemetics administered.  Anesthetic plan and risks discussed with patient.      Plan discussed with CRNA.    Attending anesthesiologist reviewed and agrees with Preprocedure content              Mitchell Ghosh MD   11/23/2024

## 2024-11-23 NOTE — OP NOTE
Operative Note      Patient: Celestine Torres  YOB: 1965  MRN: [id number]    Date of Procedure: 11/23/2024    Pre-Op Diagnosis Codes:      * Abscess of right leg [L02.415]    Post-Op Diagnosis: Same       Procedure(s):  RIGHT LEG DEBRIDEMENT INCISION AND DRAINAGE, drain placement    Surgeon(s):  Brady Noe MD    Assistant:   Surgical Assistant: Sergey Mullins    Anesthesia: General    Estimated Blood Loss (mL): 20    Complications: None    Specimens:   ID Type Source Tests Collected by Time Destination   A : right leg tissue culture #1 Tissue Leg CULTURE, Brady Morales MD 11/23/2024 1403    B : right leg tissue culture #2 Tissue Leg CULTURE, Brady Morales MD 11/23/2024 1405    C : right leg abscess #1 Swab Leg CULTURE, Brady Morales MD 11/23/2024 1405    D : right leg abscess #2 Swab Leg CULTURE, Brady Morales MD 11/23/2024 1406        Implants:  * No implants in log *      Drains:   Closed/Suction Drain Inferior;Proximal;Right;Anterior Leg Accordion (Active)       Findings:  Infection Present At Time Of Surgery (PATOS) (choose all levels that have infection present):  - Deep Infection (muscle/fascia) present as evidenced by abscess, pus, purulent fluid, and phlegmon  Other Findings: Diffuse infection down to the periosteum and fascia with debridement of both with curette debridement    Detailed Description of Procedure:     Indications: The patient sustained presented to [facility] with right leg cellulitis with abscess seen on CT scan to his anterior shin.  PCR is positive for MRSA.  The patient was educated on the risks, benefits, indications and alternatives to operative versus nonoperative management.  The patient had several questions and all were answered. The patient was educated on risks of surgery to include scar, infection, damage to surrounding vessels and nerves, stiffness, need for revision surgery, retained foreign body, early

## 2024-11-23 NOTE — PLAN OF CARE
Problem: Discharge Planning  Goal: Discharge to home or other facility with appropriate resources  11/23/2024 0759 by Ozzy Naranjo, RN  Outcome: Progressing  11/23/2024 0515 by Juliet Szymanski, RN  Outcome: Progressing  Flowsheets (Taken 11/22/2024 2010)  Discharge to home or other facility with appropriate resources:   Identify barriers to discharge with patient and caregiver   Arrange for needed discharge resources and transportation as appropriate   Identify discharge learning needs (meds, wound care, etc)     Problem: Pain  Goal: Verbalizes/displays adequate comfort level or baseline comfort level  11/23/2024 0759 by Ozzy Naranjo, RN  Outcome: Progressing  11/23/2024 0515 by Juliet Szymanski, RN  Outcome: Progressing

## 2024-11-24 LAB
ANION GAP SERPL CALC-SCNC: 7 MMOL/L (ref 2–12)
BASOPHILS # BLD: 0 K/UL (ref 0–0.1)
BASOPHILS NFR BLD: 0 % (ref 0–1)
BUN SERPL-MCNC: 16 MG/DL (ref 6–20)
BUN/CREAT SERPL: 21 (ref 12–20)
CA-I BLD-MCNC: 8.8 MG/DL (ref 8.5–10.1)
CHLORIDE SERPL-SCNC: 113 MMOL/L (ref 97–108)
CO2 SERPL-SCNC: 23 MMOL/L (ref 21–32)
CREAT SERPL-MCNC: 0.75 MG/DL (ref 0.7–1.3)
CREAT SERPL-MCNC: 0.76 MG/DL (ref 0.7–1.3)
CRP SERPL-MCNC: 4.89 MG/DL (ref 0–0.3)
DATE LAST DOSE: NORMAL
DIFFERENTIAL METHOD BLD: ABNORMAL
DOSE AMOUNT: NORMAL UNITS
EOSINOPHIL # BLD: 0 K/UL (ref 0–0.4)
EOSINOPHIL NFR BLD: 0 % (ref 0–7)
ERYTHROCYTE [DISTWIDTH] IN BLOOD BY AUTOMATED COUNT: 13.5 % (ref 11.5–14.5)
ERYTHROCYTE [SEDIMENTATION RATE] IN BLOOD: 52 MM/HR (ref 0–20)
GLUCOSE SERPL-MCNC: 123 MG/DL (ref 65–100)
HCT VFR BLD AUTO: 35.7 % (ref 36.6–50.3)
HGB BLD-MCNC: 12 G/DL (ref 12.1–17)
IMM GRANULOCYTES # BLD AUTO: 0 K/UL
IMM GRANULOCYTES NFR BLD AUTO: 0 %
LYMPHOCYTES # BLD: 1 K/UL (ref 0.8–3.5)
LYMPHOCYTES NFR BLD: 8 % (ref 12–49)
MCH RBC QN AUTO: 29.3 PG (ref 26–34)
MCHC RBC AUTO-ENTMCNC: 33.6 G/DL (ref 30–36.5)
MCV RBC AUTO: 87.1 FL (ref 80–99)
MONOCYTES # BLD: 0.5 K/UL (ref 0–1)
MONOCYTES NFR BLD: 4 % (ref 5–13)
NEUTS BAND NFR BLD MANUAL: 1 % (ref 0–6)
NEUTS SEG # BLD: 10.7 K/UL (ref 1.8–8)
NEUTS SEG NFR BLD: 87 % (ref 32–75)
NRBC # BLD: 0 K/UL (ref 0–0.01)
NRBC BLD-RTO: 0 PER 100 WBC
PLATELET # BLD AUTO: 351 K/UL (ref 150–400)
PMV BLD AUTO: 10.3 FL (ref 8.9–12.9)
POTASSIUM SERPL-SCNC: 3.6 MMOL/L (ref 3.5–5.1)
RBC # BLD AUTO: 4.1 M/UL (ref 4.1–5.7)
RBC MORPH BLD: ABNORMAL
SODIUM SERPL-SCNC: 143 MMOL/L (ref 136–145)
VANCOMYCIN SERPL-MCNC: 8.3 UG/ML
WBC # BLD AUTO: 12.2 K/UL (ref 4.1–11.1)

## 2024-11-24 PROCEDURE — 2580000003 HC RX 258: Performed by: FAMILY MEDICINE

## 2024-11-24 PROCEDURE — 6370000000 HC RX 637 (ALT 250 FOR IP): Performed by: FAMILY MEDICINE

## 2024-11-24 PROCEDURE — 2580000003 HC RX 258

## 2024-11-24 PROCEDURE — 6360000002 HC RX W HCPCS

## 2024-11-24 PROCEDURE — 86140 C-REACTIVE PROTEIN: CPT

## 2024-11-24 PROCEDURE — 85652 RBC SED RATE AUTOMATED: CPT

## 2024-11-24 PROCEDURE — 1100000000 HC RM PRIVATE

## 2024-11-24 PROCEDURE — 99232 SBSQ HOSP IP/OBS MODERATE 35: CPT | Performed by: INTERNAL MEDICINE

## 2024-11-24 PROCEDURE — 6370000000 HC RX 637 (ALT 250 FOR IP): Performed by: INTERNAL MEDICINE

## 2024-11-24 PROCEDURE — 85025 COMPLETE CBC W/AUTO DIFF WBC: CPT

## 2024-11-24 PROCEDURE — 80202 ASSAY OF VANCOMYCIN: CPT

## 2024-11-24 PROCEDURE — 36415 COLL VENOUS BLD VENIPUNCTURE: CPT

## 2024-11-24 PROCEDURE — 80048 BASIC METABOLIC PNL TOTAL CA: CPT

## 2024-11-24 RX ORDER — LINEZOLID 600 MG/1
600 TABLET, FILM COATED ORAL 2 TIMES DAILY
Qty: 28 TABLET | Refills: 0 | Status: SHIPPED | OUTPATIENT
Start: 2024-11-24 | End: 2024-12-08

## 2024-11-24 RX ADMIN — SODIUM CHLORIDE, PRESERVATIVE FREE 10 ML: 5 INJECTION INTRAVENOUS at 21:23

## 2024-11-24 RX ADMIN — VANCOMYCIN HYDROCHLORIDE 1250 MG: 1.25 INJECTION, POWDER, LYOPHILIZED, FOR SOLUTION INTRAVENOUS at 17:12

## 2024-11-24 RX ADMIN — ATORVASTATIN CALCIUM 20 MG: 20 TABLET, FILM COATED ORAL at 08:50

## 2024-11-24 RX ADMIN — PANTOPRAZOLE SODIUM 40 MG: 40 TABLET, DELAYED RELEASE ORAL at 06:45

## 2024-11-24 RX ADMIN — VANCOMYCIN HYDROCHLORIDE 1000 MG: 1 INJECTION, POWDER, LYOPHILIZED, FOR SOLUTION INTRAVENOUS at 08:51

## 2024-11-24 RX ADMIN — VANCOMYCIN HYDROCHLORIDE 1000 MG: 1 INJECTION, POWDER, LYOPHILIZED, FOR SOLUTION INTRAVENOUS at 02:04

## 2024-11-24 RX ADMIN — MUPIROCIN: 20 OINTMENT TOPICAL at 08:49

## 2024-11-24 RX ADMIN — METOPROLOL SUCCINATE 25 MG: 25 TABLET, EXTENDED RELEASE ORAL at 08:49

## 2024-11-24 RX ADMIN — MUPIROCIN: 20 OINTMENT TOPICAL at 21:23

## 2024-11-24 RX ADMIN — ENOXAPARIN SODIUM 40 MG: 100 INJECTION SUBCUTANEOUS at 08:50

## 2024-11-24 RX ADMIN — SODIUM CHLORIDE, PRESERVATIVE FREE 10 ML: 5 INJECTION INTRAVENOUS at 08:54

## 2024-11-24 ASSESSMENT — PAIN SCALES - GENERAL
PAINLEVEL_OUTOF10: 0
PAINLEVEL_OUTOF10: 0

## 2024-11-24 NOTE — PLAN OF CARE
Problem: Discharge Planning  Goal: Discharge to home or other facility with appropriate resources  Outcome: Progressing  Flowsheets (Taken 11/23/2024 1940)  Discharge to home or other facility with appropriate resources:   Identify barriers to discharge with patient and caregiver   Arrange for needed discharge resources and transportation as appropriate   Identify discharge learning needs (meds, wound care, etc)     Problem: Pain  Goal: Verbalizes/displays adequate comfort level or baseline comfort level  Outcome: Progressing

## 2024-11-25 VITALS
BODY MASS INDEX: 25.63 KG/M2 | TEMPERATURE: 98.2 F | SYSTOLIC BLOOD PRESSURE: 138 MMHG | RESPIRATION RATE: 18 BRPM | OXYGEN SATURATION: 97 % | DIASTOLIC BLOOD PRESSURE: 90 MMHG | HEIGHT: 69 IN | WEIGHT: 173.06 LBS | HEART RATE: 67 BPM

## 2024-11-25 LAB
ANION GAP SERPL CALC-SCNC: 5 MMOL/L (ref 2–12)
BASOPHILS # BLD: 0 K/UL (ref 0–0.1)
BASOPHILS NFR BLD: 0 % (ref 0–1)
BUN SERPL-MCNC: 12 MG/DL (ref 6–20)
BUN/CREAT SERPL: 16 (ref 12–20)
CA-I BLD-MCNC: 8.5 MG/DL (ref 8.5–10.1)
CHLORIDE SERPL-SCNC: 112 MMOL/L (ref 97–108)
CO2 SERPL-SCNC: 27 MMOL/L (ref 21–32)
CREAT SERPL-MCNC: 0.75 MG/DL (ref 0.7–1.3)
CRP SERPL-MCNC: 1.94 MG/DL (ref 0–0.3)
DIFFERENTIAL METHOD BLD: ABNORMAL
EOSINOPHIL # BLD: 0 K/UL (ref 0–0.4)
EOSINOPHIL NFR BLD: 0 % (ref 0–7)
ERYTHROCYTE [DISTWIDTH] IN BLOOD BY AUTOMATED COUNT: 13.5 % (ref 11.5–14.5)
GLUCOSE SERPL-MCNC: 102 MG/DL (ref 65–100)
HCT VFR BLD AUTO: 36.7 % (ref 36.6–50.3)
HGB BLD-MCNC: 12.3 G/DL (ref 12.1–17)
IMM GRANULOCYTES # BLD AUTO: 0 K/UL
IMM GRANULOCYTES NFR BLD AUTO: 0 %
LYMPHOCYTES # BLD: 1.9 K/UL (ref 0.8–3.5)
LYMPHOCYTES NFR BLD: 17 % (ref 12–49)
MCH RBC QN AUTO: 29.2 PG (ref 26–34)
MCHC RBC AUTO-ENTMCNC: 33.5 G/DL (ref 30–36.5)
MCV RBC AUTO: 87.2 FL (ref 80–99)
MONOCYTES # BLD: 0.8 K/UL (ref 0–1)
MONOCYTES NFR BLD: 7 % (ref 5–13)
MYELOCYTES NFR BLD MANUAL: 1 %
NEUTS BAND NFR BLD MANUAL: 2 % (ref 0–6)
NEUTS SEG # BLD: 8.4 K/UL (ref 1.8–8)
NEUTS SEG NFR BLD: 73 % (ref 32–75)
NRBC # BLD: 0 K/UL (ref 0–0.01)
NRBC BLD-RTO: 0 PER 100 WBC
PLATELET # BLD AUTO: 367 K/UL (ref 150–400)
PMV BLD AUTO: 10.3 FL (ref 8.9–12.9)
POTASSIUM SERPL-SCNC: 3.4 MMOL/L (ref 3.5–5.1)
RBC # BLD AUTO: 4.21 M/UL (ref 4.1–5.7)
RBC MORPH BLD: ABNORMAL
SODIUM SERPL-SCNC: 144 MMOL/L (ref 136–145)
WBC # BLD AUTO: 11.2 K/UL (ref 4.1–11.1)

## 2024-11-25 PROCEDURE — 6370000000 HC RX 637 (ALT 250 FOR IP): Performed by: FAMILY MEDICINE

## 2024-11-25 PROCEDURE — 2580000003 HC RX 258: Performed by: FAMILY MEDICINE

## 2024-11-25 PROCEDURE — 6360000002 HC RX W HCPCS

## 2024-11-25 PROCEDURE — 80048 BASIC METABOLIC PNL TOTAL CA: CPT

## 2024-11-25 PROCEDURE — 6370000000 HC RX 637 (ALT 250 FOR IP): Performed by: INTERNAL MEDICINE

## 2024-11-25 PROCEDURE — 36415 COLL VENOUS BLD VENIPUNCTURE: CPT

## 2024-11-25 PROCEDURE — 85025 COMPLETE CBC W/AUTO DIFF WBC: CPT

## 2024-11-25 PROCEDURE — 99024 POSTOP FOLLOW-UP VISIT: CPT

## 2024-11-25 PROCEDURE — 2580000003 HC RX 258

## 2024-11-25 PROCEDURE — 99232 SBSQ HOSP IP/OBS MODERATE 35: CPT | Performed by: INTERNAL MEDICINE

## 2024-11-25 PROCEDURE — 86140 C-REACTIVE PROTEIN: CPT

## 2024-11-25 RX ORDER — LINEZOLID 600 MG/1
600 TABLET, FILM COATED ORAL EVERY 12 HOURS SCHEDULED
Status: DISCONTINUED | OUTPATIENT
Start: 2024-11-25 | End: 2024-11-25 | Stop reason: HOSPADM

## 2024-11-25 RX ORDER — MUPIROCIN 20 MG/G
OINTMENT TOPICAL
Qty: 1 G | Refills: 0 | Status: SHIPPED | OUTPATIENT
Start: 2024-11-25

## 2024-11-25 RX ORDER — PANTOPRAZOLE SODIUM 40 MG/1
40 TABLET, DELAYED RELEASE ORAL
Qty: 30 TABLET | Refills: 3 | Status: SHIPPED | OUTPATIENT
Start: 2024-11-26

## 2024-11-25 RX ADMIN — ATORVASTATIN CALCIUM 20 MG: 20 TABLET, FILM COATED ORAL at 09:01

## 2024-11-25 RX ADMIN — METOPROLOL SUCCINATE 25 MG: 25 TABLET, EXTENDED RELEASE ORAL at 09:01

## 2024-11-25 RX ADMIN — MUPIROCIN: 20 OINTMENT TOPICAL at 09:01

## 2024-11-25 RX ADMIN — VANCOMYCIN HYDROCHLORIDE 1250 MG: 1.25 INJECTION, POWDER, LYOPHILIZED, FOR SOLUTION INTRAVENOUS at 04:10

## 2024-11-25 RX ADMIN — VANCOMYCIN HYDROCHLORIDE 1250 MG: 1.25 INJECTION, POWDER, LYOPHILIZED, FOR SOLUTION INTRAVENOUS at 11:16

## 2024-11-25 RX ADMIN — PANTOPRAZOLE SODIUM 40 MG: 40 TABLET, DELAYED RELEASE ORAL at 06:26

## 2024-11-25 RX ADMIN — SODIUM CHLORIDE, PRESERVATIVE FREE 10 ML: 5 INJECTION INTRAVENOUS at 09:02

## 2024-11-25 RX ADMIN — POTASSIUM BICARBONATE 40 MEQ: 782 TABLET, EFFERVESCENT ORAL at 09:01

## 2024-11-25 RX ADMIN — ENOXAPARIN SODIUM 40 MG: 100 INJECTION SUBCUTANEOUS at 09:01

## 2024-11-25 ASSESSMENT — PAIN SCALES - GENERAL: PAINLEVEL_OUTOF10: 0

## 2024-11-25 ASSESSMENT — ENCOUNTER SYMPTOMS
NAUSEA: 0
SHORTNESS OF BREATH: 0
COLOR CHANGE: 1
VOMITING: 0

## 2024-11-25 NOTE — CARE COORDINATION
Transition of Care Plan:    RUR: 5%  Prior Level of Functioning: INDEP  Disposition: HOME/SELF  If SNF or IPR: Date FOC offered:   Date FOC received:   Accepting facility:   Date authorization started with reference number:   Date authorization received and expires:   Follow up appointments: PCP  DME needed:   Transportation at discharge: SELF  IM/IMM Medicare/ letter given: NA  Is patient a  and connected with [facility]? NO   If yes, was [place] transfer form completed and [facility] notified?   Caregiver Contact: SELF  Discharge Caregiver contacted prior to discharge? NA  Care Conference needed? NO  Barriers to discharge: NONE    Pt to dc today w/ no needs from CM team. Pt declined  svcs.

## 2024-11-25 NOTE — DISCHARGE SUMMARY
tablet  Commonly known as: PROTONIX  Take 1 tablet by mouth every morning (before breakfast)  Start taking on: November 26, 2024            CONTINUE taking these medications      lovastatin 40 MG tablet  Commonly known as: MEVACOR     metoprolol succinate 25 MG extended release tablet  Commonly known as: TOPROL XL     triamcinolone 0.1 % ointment  Commonly known as: KENALOG               Where to Get Your Medications        These medications were sent to [facility] [address] - P [phone] - F [phone]  [address] [place] [id number]      Phone: [phone]   linezolid 600 MG tablet  mupirocin 2 % ointment  pantoprazole 40 MG tablet           Patient Follow Up Instructions:   Activity: PT/OT eval and treat  Diet: cardiac diet  Wound Care: as directed    Follow-up with PCP/ID/Orthopedic Surgery in 2 weeks.  Follow-up tests/labs As per below physicians  Follow-up Information       Follow up With Specialties Details Why Contact Info    Celestine Charles MD Family Medicine Follow up in 2 week(s)  [id number] [facility] [id number] [phone]      Dario Valdez MD Infectious Diseases Follow up in 2 week(s)  [address] [id number] [phone]      Brady Noe MD Orthopedic Surgery Follow up in 2 week(s)  [address] [facility] [address] [phone]            ________________________________________________________________    Risk of deterioration: Low    Condition at Discharge:  Stable  __________________________________________________________________    Disposition  Home with family and home health services    ____________________________________________________________________    Code Status: Full Code  ___________________________________________________________________      Total time in minutes spent coordinating this discharge (includes going over instructions, follow-up, prescriptions, and preparing

## 2024-11-25 NOTE — PROGRESS NOTES
Hospitalist Progress Note    NAME:   Celestine Torres   : 1965   MRN: [id number]     Date/Time: 2024 1:49 PM  Patient PCP: Celestine Charles MD    Estimated discharge date: 24  Barriers: IV antibiotics, infectious disease consult, possible orthopedic surgery intervention    Hospital Course:  Celestine Torres is a 59-year-old male with past medical history of hypertension, hyperlipidemia, GERD who was sent to the ED on  by dermatology provider for bump on the right anterior tibia concerning for cellulitis or abscess.  Patient also reports history of MRSA skin infection. He states that several weeks ago, his dermatologist prescribed him a topical ointment for a rash in the same area and a few days after applying the cream he noticed a formation of a small bump that grew in size and became red and painful.  He was seen at urgent care 2 days ago where he was diagnosed with cellulitis and prescribed Bactrim; he has taken 2 doses.  He had a follow-up appointment with his dermatologist yesterday who recommended he go to the ED for IV antibiotics.    X-ray of right tibia-fibula shows diffuse soft tissue swelling around the knee, calf, and ankle with no focal osteopenia or erosion to suggest osteomyelitis.  Admission labs significant for WBC count of 14.3 with left shift, procalcitonin of 0.13, CRP of 16.1, and ESR of 72.  He received 1 dose of IV cefepime and was started on IV fluids and vancomycin and Unasyn. Given patient's history of MRSA skin infections, will change Unasyn to Zosyn and await ID input. Blood cultures pending. Orthopedic surgery has seen the patient.  Right tibia-fibula CT pending to evaluate for fluid collection/abscess.  CRP and ESR pending.  Orthopedic surgery will offer further recommendations pending imaging.      Assessment / Plan:  Sepsis secondary to cellulitis  Right anterior tibia cellulitis  History of MRSA  -Patient afebrile, normotensive  -Tachycardia 
      Hospitalist Progress Note    NAME:   Celestine Torres   : 1965   MRN: [id number]     Date/Time: 2024 10:11 AM  Patient PCP: Celestine Charles MD    Estimated discharge date: 24  Barriers: IV antibiotics, infectious disease consult, possible orthopedic surgery intervention    Hospital Course:  Celestine Torres is a 59-year-old male with past medical history of hypertension, hyperlipidemia, GERD who was sent to the ED on  by dermatology provider for bump on the right anterior tibia concerning for cellulitis or abscess.  Patient also reports history of MRSA skin infection. He states that several weeks ago, his dermatologist prescribed him a topical ointment for a rash in the same area and a few days after applying the cream he noticed a formation of a small bump that grew in size and became red and painful.  He was seen at urgent care 2 days ago where he was diagnosed with cellulitis and prescribed Bactrim; he took 2 doses prior to arrival.  He then had a follow-up appointment with his dermatologist  who recommended he go to the ED immediately for IV antibiotics.    X-ray of right tibia-fibula shows diffuse soft tissue swelling around the knee, calf, and ankle with no focal osteopenia or erosion to suggest osteomyelitis.   Right tibia-fibula CT shows fluid collection in anterior mid lower leg soft tissues, concerning for abscess; diffuse cellulitis. Admission labs significant for WBC count of 14.3 with left shift, procalcitonin of 0.13, CRP of 16.1, and ESR of 72.  He received 1 dose of IV cefepime and was started on IV fluids, vancomycin, and Zosyn.  MRSA nares swab positive.  Per ID recommendations, continue IV vancomycin as there is high risk that this is Staphylococcus aureus infection, either MRSA or MSSA; Zosyn can be discontinued. Blood cultures pending, preliminary reading shows no growth at 2 days. WBC, CRP, and ESR all trending down.  Right leg debridement incision and 
      Hospitalist Progress Note    NAME:   Celestine Torres   : 1965   MRN: [id number]     Date/Time: 2024 10:08 AM  Patient PCP: Celestine Charles MD    Estimated discharge date: 24  Barriers: ID clearance, orthopedic surgery clearance    Hospital Course:  Celestine Torres is a 59-year-old male with past medical history of hypertension, hyperlipidemia, GERD who was sent to the ED on  by dermatology provider for bump on the right anterior tibia concerning for cellulitis or abscess.  Patient also reports history of MRSA skin infection. He states that several weeks ago, his dermatologist prescribed him a topical ointment for a rash in the same area and a few days after applying the cream he noticed a formation of a small bump that grew in size and became red and painful.  He was seen at urgent care 2 days ago where he was diagnosed with cellulitis and prescribed Bactrim; he took 2 doses prior to arrival.  He then had a follow-up appointment with his dermatologist  who recommended he go to the ED immediately for IV antibiotics.    X-ray of right tibia-fibula shows diffuse soft tissue swelling around the knee, calf, and ankle with no focal osteopenia or erosion to suggest osteomyelitis.   Right tibia-fibula CT shows fluid collection in anterior mid lower leg soft tissues, concerning for abscess and diffuse cellulitis. He received 1 dose of IV cefepime and was started on IV fluids, vancomycin, and Zosyn.  Blood and wound cultures pending.  MRSA nares swab positive.  ID consulted and recommend continue IV vancomycin as there is high risk that this is Staphylococcus aureus infection, either MRSA or MSSA; Zosyn discontinued.  POD #1 s/p right leg debridement incision and drainage with drain placement.  Awaiting ID and orthopedic surgery clearance for discharge.    Assessment / Plan:  Sepsis secondary to cellulitis, resolved  Tachycardia, resolved  Right anterior tibia cellulitis  History of 
    ORTHOPEDIC CONSULT    Patient: Celestine Torres MRN: [id number]  SSN: [id number]    YOB: 1965  Age: 59 y.o.  Sex: male      Subjective:      Celestine Torres is a 59 y.o. male with past medical history of hypertension, hypercholesterolemia who is being seen for right leg cellulitis/abscess.  Patient reports about 2 weeks ago he began to have a small bump along the right lower leg.  He reports he was seen by dermatology for this issue.  He states he was prescribed topical ointment for this, according to paperwork that patient has with him he was prescribed betamethasone cream.  He reports a few days after applying the cream is when he noticed the area began to grow in size and become painful and red.  Due to the pain the patient states he was seen in urgent care 2 days ago.  While there he was diagnosed with cellulitis and they prescribed him Bactrim for which he has only taken 2 doses.  He returned back to his dermatology office and was advised that he needed to come to the ED and be admitted for IV antibiotics.  Patient was transported to [facility] ED.  While in the ED x-rays of the right tibia/fibula were obtained which revealed diffuse soft tissue swelling no focal osteopenia or erosions to suggest osteomyelitis.  A bedside ultrasound was also performed which did not reveal a fluid collection along the anterior shin.  Patient received 1 dose of IV cefepime while in the ED.  Patient was then admitted to the floor and started on IV vancomycin and IV Zosyn.      Of note patient reports history of an abscess to the right foot in the past as well as an abscess along the gluteal region.  He reports he was seen in urgent care for these and they were I&D and he was given oral antibiotics which he did well with.  He reports these abscesses came in the last 2 years and does not have a prior history of them prior to 2022.      Denies any recent falls or injuries to the skin, denies any bug 
    ORTHOPEDIC PROGRESS NOTE    Patient: Celestine Torres MRN: [id number]  SSN: [id number]    YOB: 1965  Age: 59 y.o.  Sex: male      Subjective:      Celestine Torres is a 59 y.o. male with past medical history of hypertension, hypercholesterolemia who is being seen for right leg cellulitis/abscess.  Patient reports about 2 weeks ago he began to have a small bump along the right lower leg.  He reports he was seen by dermatology for this issue.  He states he was prescribed topical ointment for this, according to paperwork that patient has with him he was prescribed betamethasone cream.  He reports a few days after applying the cream is when he noticed the area began to grow in size and become painful and red.  Due to the pain the patient states he was seen in urgent care 2 days ago.  While there he was diagnosed with cellulitis and they prescribed him Bactrim for which he has only taken 2 doses.  He returned back to his dermatology office and was advised that he needed to come to the ED and be admitted for IV antibiotics.  Patient was transported to [facility] ED.  While in the ED x-rays of the right tibia/fibula were obtained which revealed diffuse soft tissue swelling no focal osteopenia or erosions to suggest osteomyelitis.  A bedside ultrasound was also performed which did not reveal a fluid collection along the anterior shin.  Patient received 1 dose of IV cefepime while in the ED.  Patient was then admitted to the floor and started on IV vancomycin and IV Zosyn.      Of note patient reports history of an abscess to the right foot in the past as well as an abscess along the gluteal region.  He reports he was seen in urgent care for these and they were I&D and he was given oral antibiotics which he did well with.  He reports these abscesses came in the last 2 years and does not have a prior history of them prior to 2022.      Denies any recent falls or injuries to the skin, denies 
0410: RN educated patient regarding IV site change per protocol, patient refuses at this time stating \"I want to talk to the doctor about going home today, then we'll talk about moving it.\"   
11/25/2024        RE: Celestine Torres         [address]  [facility] [address]          To Whom It May Concern,      Due to medical reasons, Celestine Torres   should remain out of work until he follows up with orthopedic surgeon in 1 week.         Sincerely,        Belem Mei RN   
4 Eyes Skin Assessment     NAME:  Celestine Torres  YOB: 1965  MEDICAL RECORD NUMBER:  [id number]    The patient is being assessed for  Admission    I agree that at least one RN has performed a thorough Head to Toe Skin Assessment on the patient. ALL assessment sites listed below have been assessed.      Areas assessed by both nurses:    Head, Face, Ears, Shoulders, Back, Chest, Arms, Elbows, Hands, Sacrum. Buttock, Coccyx, Ischium, and Legs. Feet and Heels        Does the Patient have a Wound? Yes wound(s) were present on assessment. LDA wound assessment was Initiated and completed by RN Patient has cellulitis on right lower leg       Rodolfo Prevention initiated by RN: Yes  Wound Care Orders initiated by RN: No consult in place by physician    Pressure Injury (Stage 3,4, Unstageable, DTI, NWPT, and Complex wounds) if present, place Wound referral order by RN under : No    New Ostomies, if present place, Ostomy referral order under : No     Nurse 1 eSignature: Electronically signed by Rob Nolan RN on 11/22/24 at 2:20 AM EST    **SHARE this note so that the co-signing nurse can place an eSignature**    Nurse 2 eSignature: Electronically signed by Audrey Tirado RN on 11/22/24 at 5:51 AM EST    
Patient taken downstairs by wheelchair as he states his vehicle is in the parking lot and he will drive himself home. No signs or symptoms of distress noted.    DCP: home, self.    Discharge plan of care/case management plan validated with provider discharge order.   
Pt declines any family being notified at this time     
Pt off floor with transport to PACU  
Received Order for Telemetry     Celestine Echevarriaorhees   1965   [id number]   Cellulitis [L03.90]  Cellulitis of right lower extremity [L03.115]   Jana Mir MD     Tele Box # [id number] placed on patient at  2141 pm  ER Room #  06  Admitting to Room 221  Verified with Primary Nurse ASHLEY GAMINO at  2206 pm    
Vancomycin Dosing Consult  Celestine Torres is a 59 y.o. male with cellulitis of right lower leg. Pharmacy was consulted by Dr. Mir to dose and monitor vancomycin. Today is day 1.    Antibiotic regimen: Vancomycin + Zosyn    Temp (24hrs), Av.8 °F (37.1 °C), Min:98.2 °F (36.8 °C), Max:99.5 °F (37.5 °C)    Recent Labs     24  1722 24  1728 24  0954   WBC 14.3*  --  12.4*   CREATININE 0.98 0.78 0.95   BUN 22*  --   --      Est CrCl: 85 mL/min  Concomitant nephrotoxic drugs: None    Cultures:    Blood: NGTD, prelim     MRSA Swab: Pending    Target range: AUC/ZORAN 400-600    Recent level history:  Date/Time Dose & Interval Measured Level (mcg/mL) Associated AUC/ZORAN    @ 1604 2000 mg x 1; 1000mg q12h 5.3 355        Assessment/Plan:   Sepsis secondary to cellulitis  Current regimen predicted to be subtherapeutic, increase to Vancomycin 1000 mg q8h with predicted AUC of 519  Schedule level for  @ 0800  BMP ordered daily x 3 days  Antimicrobial stop date 5 days per consult        
Vancomycin Dosing Consult  Celestine Torres is a 59 y.o. male with cellulitis of right lower leg. Pharmacy was consulted by Dr. Mir to dose and monitor vancomycin. Today is day 1.    Antibiotic regimen: Vancomycin + ampicillin-sulbactam    Temp (24hrs), Av.5 °F (36.9 °C), Min:98.4 °F (36.9 °C), Max:98.6 °F (37 °C)    Recent Labs     24  1722 24  1728   WBC 14.3*  --    CREATININE 0.98 0.78   BUN 22*  --      Est CrCl: 103 mL/min  Concomitant nephrotoxic drugs: None    Cultures:   Blood x 2 = in progress    MRSA Swab: Not ordered, patient already received first dose of vancomycin    Target range: AUC/ZORAN 400-600    Recent level history:  Date/Time Dose & Interval Measured Level (mcg/mL) Associated AUC/ZORAN              Assessment/Plan:   Patient received Vancomycin 2000 mg IV x 1 dose on 24 @ 1802  Continue Vancomycin 1000 mg IV every 12 hours with next dose due  @ 0600  Vancomycin trough level to be drawn 24 @ 1600  Antimicrobial stop date 5 days per consult      
Vancomycin Dosing Consult  Celestine Torres is a 59 y.o. male with cellulitis of right lower leg. Pharmacy was consulted by Dr. Mir to dose and monitor vancomycin. Today is day 3.    Antibiotic regimen: Vancomycin monotherapy    Temp (24hrs), Av °F (36.7 °C), Min:96.9 °F (36.1 °C), Max:98.6 °F (37 °C)    Recent Labs     24  1722 24  1728 24  0954 24  0703 24  0733 24  0735   WBC 14.3*  --  12.4* 10.0  --  12.2*   CREATININE 0.98   < > 0.95 0.74 0.75 0.76   BUN 22*  --   --  11  --  16    < > = values in this interval not displayed.     Est CrCl: >100 mL/min  Concomitant nephrotoxic drugs: None    Cultures:    Blood: NGTD, prelim    Anaerobic: pending   Wound (right leg): pending   Tissue: pending    MRSA Swab: Detected     Target range: AUC/ZORAN 400-600    Recent level history:  Date/Time Dose & Interval Measured Level (mcg/mL) Associated AUC/ZORAN    @ 1604 2000 mg x 1; 1000mg q12h 5.3 355    @ 0735 1000 mg q8h 8.3 370        Assessment/Plan:   Cellulitis right leg with abscess   Scr stable   Current regimen predicted to be subtherapeutic, increase to Vancomycin 1250 mg q8h with predicted AUC of 457  Schedule level for  @ 0800  Antimicrobial stop date 5 days per consult          
  Skin:     Findings: No rash.   Neurological:      General: No focal deficit present.      Mental Status: He is alert and oriented to person, place, and time.   Psychiatric:         Mood and Affect: Mood normal.         Behavior: Behavior normal.         Thought Content: Thought content normal.         Judgment: Judgment normal.         Media Information    Document Information     Wound Care Image: Wound       11/22/2024 09:57   Attached To:   Hospital Encounter on 11/21/24   Source Information     Tanvi Sin PA-C  [facility] [place]   Document History       Labs/Imaging/Diagnostics    Labs:  CBC:  Recent Labs     11/22/24  0954 11/23/24  0703 11/24/24  0735   WBC 12.4* 10.0 12.2*   RBC 4.35 4.04* 4.10   HGB 12.8 11.7* 12.0*   HCT 38.1 35.2* 35.7*   MCV 87.6 87.1 87.1   RDW 13.7 13.8 13.5    300 351     CHEMISTRIES:  Recent Labs     11/23/24  0703 11/24/24  0733 11/24/24  0735     --  143   K 3.9  --  3.6   *  --  113*   CO2 26  --  23   BUN 11  --  16   CREATININE 0.74 0.75 0.76   GLUCOSE 109*  --  123*        Procal <0.05 <0.13    CRP 4.80 <8.31 <16.10         ESR 72     MRSA nasal PCR Positive    Blood cultures (11/21) No growth after 3 days   Blood cultures (11/21) No growth after 3 days    Wound culture right leg anaerobic (11/23) in process  Wound culture right leg aerobic (11/23) GPC in clusters  Tissue culture right leg (11/23) GPC in clusters  Tissue culture right leg (11/23) GPC in clusters    Imaging Last 24 Hours:     CT TIBIA FIBULA RIGHT WO CONTRAST     Result Date: 11/22/2024  Fluid collection in the anterior mid lower leg soft tissues, concerning for abscess; associated skin thickening and irregularity. Diffuse cellulitis. Electronically signed by JENNIE RAO    Assessment//Plan           Hospital Problems             Last Modified POA    * (Principal) Cellulitis 11/21/2024 Yes    Cellulitis and abscess of right leg 11/23/2024 Yes    Sepsis without acute organ 
[facility]A    Assessment//Plan           Hospital Problems             Last Modified POA    * (Principal) Cellulitis 11/21/2024 Yes    Cellulitis and abscess of right leg 11/23/2024 Yes    Sepsis without acute organ dysfunction (HCC) 11/23/2024 Yes     Cellulitis right leg with abscess, likely secondary to MRSA, status post debridement, incision and drainage  Sepsis with leukocytosis, elevated procal, CRP and ESR, resolving  MRSA nasal colonization on nasal Mupirocin  History of MRSA skin infection     Comment:  Gram stains all showing Gram positive cocci in clusters consistent with MRSA (or MSSA). There should be no resistance to Linezolid.    Plan   1. Discharge on Linezolid 600 mg po BID for 14 days (prior authorization approved)  2. Discharge on nasal Mupirocin  3.  Follow-up blood cultures and wound cultures until finalized      Electronically signed by Dario Valdez MD      
isolate in Sept 2023 was resistant to Bactrim.     Plan   1. Continue IV Vancomycin  2. In am, repeat  procal and CRP  3. Follow-up blood cultures  4. Nasal mupirocin BID  5.  Follow-up any intra-operative cultures    Electronically signed by Dario Valdez MD on 11/23/24 at 6:48 PM EST    
intact: LE bilat.    Motor: + DF/PF/EHL.   Musculoskeletal: Examination of the right lower leg: Unchanged.  Moderate swelling, mild erythema noted to the anterior shin which is improved per patient, warmth noted, along the anterior tibia there is a 4.5cm area of swelling and redness with skin desquamation noted to medial aspect with small amount of bloody drainage possibly small abscess. Mild soreness with palpation along the anterior lower leg, Calves soft, supple, non-tender upon palpation. ROM of the knee is full without pain, Dorsiflexion/plantarflexion intact, able to range toes without difficulty, pulses intact, sensations intact  Contralateral left lower leg examination is within normal limits without pain        Labs:  CBC:  Recent Labs     11/21/24  1722 11/22/24  0954 11/23/24  0703   WBC 14.3* 12.4* 10.0   RBC 4.49 4.35 4.04*   HGB 13.3 12.8 11.7*   HCT 39.1 38.1 35.2*   MCV 87.1 87.6 87.1   RDW 13.4 13.7 13.8    312 300     CHEMISTRIES:  Recent Labs     11/21/24  1722 11/23/24  0703    143   K 3.8 3.9    115*   CO2 25 26   BUN 22* 11   GLUCOSE 112* 109*   PT/INR:No results for input(s): \"PROTIME\", \"INR\" in the last 72 hours.  APTT:No results for input(s): \"APTT\" in the last 72 hours.  LIVER PROFILE:  Recent Labs     11/21/24  1722   AST 19   ALT 34   BILITOT 0.8   ALKPHOS 105       IMAGING:  EXAM: XR TIBIA FIBULA RIGHT (2 VIEWS)     INDICATION: Osteomyelitis.     COMPARISON: None.     IMPRESSION:  AP and lateral  views of the right tibia and fibula demonstrate  diffuse soft tissue swelling around the knee, calf, and ankle. No focal  osteopenia or erosion to suggest osteomyelitis.    EXAM: CT TIBIA FIBULA RIGHT WO CONTRAST     INDICATION: Right leg cellulitis rule out abscess/ fluid collection along  anterior shin       COMPARISON: Right tibia-fibula radiographs 11/22/2024, CT right foot 9/24/2023     TECHNIQUE: Helical CT of the right tibia fibula with coronal and sagittal  reformats.

## 2024-11-26 LAB
BACTERIA SPEC CULT: ABNORMAL
BACTERIA SPEC CULT: NORMAL
GRAM STN SPEC: ABNORMAL
GRAM STN SPEC: ABNORMAL
Lab: ABNORMAL
Lab: NORMAL

## 2024-11-27 LAB
BACTERIA SPEC CULT: ABNORMAL
GRAM STN SPEC: ABNORMAL
Lab: ABNORMAL
Lab: ABNORMAL

## 2024-11-28 LAB
BACTERIA SPEC CULT: NORMAL
BACTERIA SPEC CULT: NORMAL
Lab: NORMAL
Lab: NORMAL

## 2024-12-04 DIAGNOSIS — Z47.89 ORTHOPEDIC AFTERCARE: Primary | ICD-10-CM

## 2024-12-16 ENCOUNTER — OFFICE VISIT (OUTPATIENT)
Age: 59
End: 2024-12-16

## 2024-12-16 VITALS
TEMPERATURE: 95.6 F | OXYGEN SATURATION: 96 % | HEART RATE: 92 BPM | BODY MASS INDEX: 25.18 KG/M2 | HEIGHT: 69 IN | WEIGHT: 170 LBS | SYSTOLIC BLOOD PRESSURE: 160 MMHG | DIASTOLIC BLOOD PRESSURE: 96 MMHG

## 2024-12-16 DIAGNOSIS — Z09 POSTOP CHECK: Primary | ICD-10-CM

## 2024-12-16 PROCEDURE — 99024 POSTOP FOLLOW-UP VISIT: CPT | Performed by: ORTHOPAEDIC SURGERY

## 2024-12-16 ASSESSMENT — PATIENT HEALTH QUESTIONNAIRE - PHQ9
SUM OF ALL RESPONSES TO PHQ9 QUESTIONS 1 & 2: 0
1. LITTLE INTEREST OR PLEASURE IN DOING THINGS: NOT AT ALL
2. FEELING DOWN, DEPRESSED OR HOPELESS: NOT AT ALL
SUM OF ALL RESPONSES TO PHQ QUESTIONS 1-9: 0

## 2024-12-16 NOTE — PROGRESS NOTES
Identified pt with two pt identifiers (name and ). Reviewed chart in preparation for visit and have obtained necessary documentation.    Celestine Torres is a 59 y.o. male Post-Op Check (1 week s/p right leg I&d )  .    Vitals:    24 1319   BP: (!) 148/95   Site: Left Upper Arm   Position: Sitting   Cuff Size: Medium Adult   Pulse: 92   Temp: (!) 95.6 °F (35.3 °C)   TempSrc: Temporal   SpO2: 96%   Weight: 77.1 kg (170 lb)   Height: 1.753 m (5' 9.02\")          1. Have you been to the ER, urgent care clinic since your last visit?  Hospitalized since your last visit?  no     2. Have you seen or consulted any other health care providers outside of the [facility] since your last visit?  Include any pap smears or colon screening.      BP elevated. Patient advised to  follow up with PCP and check BP twice a day at home. Pt is on BP medication and is aware of the elevation.

## 2024-12-16 NOTE — PROGRESS NOTES
is here for a follow up visit after undergoing RIGHT LEG DEBRIDEMENT INCISION AND DRAINAGE, drain placement - Right on 11/23/2024 by Dr. Noe for abscess.   Cultures grew MRSA and he was discharged on 11/26 on linezolid for 14 days. Pain has been appropriate since surgery, no major medical complications since surgery. Patient reports pain is controlled .    The patient denies fevers, chills, nausea, vomiting, numbness/paresthesias.  The patient has been following the weight bearing precautions: WBAT RLE.  He has been covering incision with a bandage, reports some serous drainage.   Current Outpatient Medications on File Prior to Visit   Medication Sig Dispense Refill    mupirocin (BACTROBAN) 2 % ointment Apply topically 3 times daily. 1 g 0    pantoprazole (PROTONIX) 40 MG tablet Take 1 tablet by mouth every morning (before breakfast) 30 tablet 3    metoprolol succinate (TOPROL XL) 25 MG extended release tablet       lovastatin (MEVACOR) 40 MG tablet Take 1 tablet by mouth daily      triamcinolone (KENALOG) 0.1 % ointment        No current facility-administered medications on file prior to visit.       ROS:  General: denies agitation, fevers/chills  Cardiac: denies major chest pain  Gastrointestinal: denies nausea/vomiting  Neurologic: Denies numbness/paresthesias  Skin: Denies erythema, warmth to touch, active drainage  Musculoskeletal: Endorses appropriate pain at surgical site      Physical Examination:    Blood pressure (!) 160/96, pulse 92, temperature (!) 95.6 °F (35.3 °C), temperature source Temporal, height 1.753 m (5' 9.02\"), weight 77.1 kg (170 lb), SpO2 96%.    GENERAL: Patient is a healthy-appearing and in no apparent distress. Afebrile, normotensive, regular rate  MENTAL STATUS: Alert and oriented to time, place, person; mood and affect normal.  PULMONARY: Respiratory rate normal and non-labored on room air.  GASTROINTESTINAL: Nondistended abdomen  CARDIAC: Regular rate and rhythm.

## 2024-12-17 ENCOUNTER — OFFICE VISIT (OUTPATIENT)
Age: 59
End: 2024-12-17
Payer: COMMERCIAL

## 2024-12-17 VITALS
OXYGEN SATURATION: 97 % | HEART RATE: 92 BPM | BODY MASS INDEX: 24.88 KG/M2 | HEIGHT: 69 IN | RESPIRATION RATE: 16 BRPM | DIASTOLIC BLOOD PRESSURE: 91 MMHG | TEMPERATURE: 98.1 F | SYSTOLIC BLOOD PRESSURE: 144 MMHG | WEIGHT: 168 LBS

## 2024-12-17 DIAGNOSIS — B95.62 INFECTION OF SKIN DUE TO METHICILLIN RESISTANT STAPHYLOCOCCUS AUREUS (MRSA): ICD-10-CM

## 2024-12-17 DIAGNOSIS — Z22.322 MRSA COLONIZATION: ICD-10-CM

## 2024-12-17 DIAGNOSIS — L08.9 INFECTION OF SKIN DUE TO METHICILLIN RESISTANT STAPHYLOCOCCUS AUREUS (MRSA): ICD-10-CM

## 2024-12-17 DIAGNOSIS — L02.415 CELLULITIS AND ABSCESS OF RIGHT LEG: Primary | ICD-10-CM

## 2024-12-17 DIAGNOSIS — L03.115 CELLULITIS AND ABSCESS OF RIGHT LEG: Primary | ICD-10-CM

## 2024-12-17 PROCEDURE — 99214 OFFICE O/P EST MOD 30 MIN: CPT | Performed by: INTERNAL MEDICINE

## 2024-12-17 PROCEDURE — 3077F SYST BP >= 140 MM HG: CPT | Performed by: INTERNAL MEDICINE

## 2024-12-17 PROCEDURE — 3079F DIAST BP 80-89 MM HG: CPT | Performed by: INTERNAL MEDICINE

## 2024-12-17 NOTE — PROGRESS NOTES
\"Have you been to the ER, urgent care clinic since your last visit?  Hospitalized since your last visit?\"    YES - When: approximately 11/21/24 ago.  Where and Why: [facility] for cellulitis.    “Have you seen or consulted any other health care providers outside our system since your last visit?”    YES - When: approximately 12//2024 ago.  Where and Why: PCP for follow up.      “Have you had a colorectal cancer screening such as a colonoscopy/FIT/Cologuard?    NO    No colonoscopy on file  No cologuard on file  No FIT/FOBT on file   No flexible sigmoidoscopy on file     Chief Complaint   Patient presents with    Follow-Up from Hospital     Cellulitis     BP (!) 144/91 (Site: Left Upper Arm, Position: Sitting, Cuff Size: Medium Adult)   Pulse 92   Temp 98.1 °F (36.7 °C) (Temporal)   Resp 16   Ht 1.753 m (5' 9\")   Wt 76.2 kg (168 lb)   SpO2 97%   BMI 24.81 kg/m²

## 2024-12-20 DIAGNOSIS — L08.9 WOUND INFECTION: Primary | ICD-10-CM

## 2024-12-20 DIAGNOSIS — T14.8XXA WOUND INFECTION: Primary | ICD-10-CM

## 2024-12-20 LAB — MRSA SPEC QL CULT: NEGATIVE

## 2024-12-20 RX ORDER — CIPROFLOXACIN 500 MG/1
750 TABLET, FILM COATED ORAL 2 TIMES DAILY
Qty: 42 TABLET | Refills: 0 | Status: SHIPPED | OUTPATIENT
Start: 2024-12-20 | End: 2025-01-03

## 2024-12-20 NOTE — PROGRESS NOTES
Patient with leg wound concerning for superinfection with preliminary culture showing Gram negative rods.  Will start Ciprofloxacin empirically pending final results.  
details…

## 2024-12-20 NOTE — RESULT ENCOUNTER NOTE
Patient with leg wound concerning for superinfection with preliminary culture showing Gram negative rods.  Will start Ciprofloxacin empirically pending final results.

## 2024-12-21 NOTE — PROGRESS NOTES
Subjective    Celestine Torres is a 59 y.o. male    HPI  Patient followed at [facility] for cellulitis right leg with abscess, undergoing leg debridement incision and drainage with a drain placed. Intra-operative cultures were sent and still pending at the time of discharge.  He was discharged on Linezolid.  Drain was pulled prior to discharge. Wound culture subsequently grew MRSA sensitive to Linezolid.  He is here today for hospital follow-up.  He still has an open wound.       Review of Systems   Constitutional:  Negative for chills and fever.   Skin:  Positive for wound. Negative for rash.   Allergic/Immunologic: Negative for immunocompromised state.   Hematological:  Negative for adenopathy.         Past Medical History:   Diagnosis Date    Hypercholesteremia     Hypertension         Past Surgical History:   Procedure Laterality Date    FOOT DEBRIDEMENT Right 11/23/2024    RIGHT LEG DEBRIDEMENT INCISION AND DRAINAGE, drain placement performed by Brady Noe MD at [facility] MAIN OR        Vitals:    12/17/24 0918 12/17/24 0924   BP: (!) 140/89 (!) 144/91   Site: Left Upper Arm Left Upper Arm   Position: Sitting Sitting   Cuff Size: Medium Adult Medium Adult   Pulse: 92    Resp: 16    Temp: 98.1 °F (36.7 °C)    TempSrc: Temporal    SpO2: 97%    Weight: 76.2 kg (168 lb)    Height: 1.753 m (5' 9\")        Objective  Physical Exam  Vitals and nursing note reviewed.   Constitutional:       Appearance: Normal appearance. He is not ill-appearing.   HENT:      Head: Normocephalic and atraumatic.      Right Ear: External ear normal.      Left Ear: External ear normal.      Nose: Nose normal.      Mouth/Throat:      Pharynx: Oropharynx is clear.   Musculoskeletal:      Right lower leg: Edema present.      Left lower leg: No edema.        Legs:       Comments: Open wound right anterior calf with moderate drainage   Skin:     Findings: Erythema present. No rash.   Neurological:      General: No focal deficit present.      Mental

## 2024-12-22 LAB
BACTERIA SPEC AEROBE CULT: ABNORMAL
BACTERIA SPEC ANAEROBE CULT: ABNORMAL

## 2024-12-27 ENCOUNTER — OFFICE VISIT (OUTPATIENT)
Age: 59
End: 2024-12-27

## 2024-12-27 VITALS — BODY MASS INDEX: 24.8 KG/M2 | HEIGHT: 69 IN

## 2024-12-27 DIAGNOSIS — Z47.89 SURGICAL AFTERCARE, MUSCULOSKELETAL SYSTEM: Primary | ICD-10-CM

## 2024-12-27 PROCEDURE — 99024 POSTOP FOLLOW-UP VISIT: CPT | Performed by: ORTHOPAEDIC SURGERY

## 2024-12-27 ASSESSMENT — PATIENT HEALTH QUESTIONNAIRE - PHQ9
1. LITTLE INTEREST OR PLEASURE IN DOING THINGS: NOT AT ALL
SUM OF ALL RESPONSES TO PHQ9 QUESTIONS 1 & 2: 0
SUM OF ALL RESPONSES TO PHQ QUESTIONS 1-9: 0
SUM OF ALL RESPONSES TO PHQ QUESTIONS 1-9: 0
2. FEELING DOWN, DEPRESSED OR HOPELESS: NOT AT ALL
SUM OF ALL RESPONSES TO PHQ QUESTIONS 1-9: 0
SUM OF ALL RESPONSES TO PHQ QUESTIONS 1-9: 0

## 2024-12-27 NOTE — PROGRESS NOTES
Identified pt with two pt identifiers (name and ). Reviewed chart in preparation for visit and have obtained necessary documentation.    Celestine Torres is a 59 y.o. male Post-Op Check (Right Leg I&D)  .    Vitals:    24 1345   Height: 1.753 m (5' 9.02\")          1. Have you been to the ER, urgent care clinic since your last visit?  Hospitalized since your last visit?  no     2. Have you seen or consulted any other health care providers outside of the [facility] since your last visit?  Include any pap smears or colon screening.  no

## 2024-12-27 NOTE — PROGRESS NOTES
Orthopedic Ambulatory Note         Patient Name:Celestine Torres     YOB: 1965     Age:59 y.o.      male     SUBJECTIVE    SURGERY: RIGHT LEG DEBRIDEMENT INCISION AND DRAINAGE, drain placement - Right   SURGEON: Hasmukh   DOS: 11/23/2024     Celestine Torres is a 59 y.o. male who presents for wound check of right anterior tibia abscess s/p I&D. Patient saw wound care 1 time but has been doing daily wound care on his own. Reports that it has been healing up well.      The patient denies any calf pain, shortness of breath, chest pain, fevers, or chills.    They are in physical therapy.    Physical Exam   General: alert, cooperative, no acute distress.   HEENT: Hearing loss  Gastrointestinal:  non-distended .    Cardiovascular: equal pulses in the lower extremities  Respiratory: No respiratory distress, nasal cannula noted   Neurological: Neurovascular exam within normal limits.    Right tibia: wound appears healthy with good granulation tissue. No drainage or local redness, no foul odor. Able to dorsiflex/plantarflex ankle, flex/extend toes. Sensation grossly intact about dorsal and plantar foot. Foot warm and perfused.      IMAGING   N/a    ASSESSMENT/PLAN        Healing Right leg abscess s/p I&D.     Keep dry when showering, no baths or swimming. See back for final wound check in 2-3 weeks. Continue daily dry dressing changes.    Weight Bearing Status: WBAT

## (undated) DEVICE — APPLICATOR MEDICATED 26 CC SOLUTION HI LT ORNG CHLORAPREP

## (undated) DEVICE — BASIC SINGLE BASIN-LF: Brand: MEDLINE INDUSTRIES, INC.

## (undated) DEVICE — GLOVE SURG SZ 8 L12IN FNGR THK79MIL GRN LTX FREE

## (undated) DEVICE — UPPER EXTREMITY: Brand: MEDLINE INDUSTRIES, INC.

## (undated) DEVICE — GLOVE ORTHO 8   MSG9480

## (undated) DEVICE — 3M™ STERI-DRAPE™ U-DRAPE 1015: Brand: STERI-DRAPE™

## (undated) DEVICE — DRAPE,HAND,STERILE: Brand: MEDLINE

## (undated) DEVICE — SOLUTION IRRIG 500ML 0.9% SOD CHLO USP POUR PLAS BTL

## (undated) DEVICE — 3M™ STERI-DRAPE™ INCISE DRAPE 1050 (60CM X 45CM): Brand: STERI-DRAPE™

## (undated) DEVICE — GARMENT,MEDLINE,DVT,INT,CALF,MED, GEN2: Brand: MEDLINE

## (undated) DEVICE — TUBING SUCTION UNIV 3/16 INX20 FT W/ SCALLOPED CONN STRL

## (undated) DEVICE — PADDING CAST W4INXL4YD SYN NAT PROTOUCH

## (undated) DEVICE — DRAPE,REIN 53X77,STERILE: Brand: MEDLINE